# Patient Record
Sex: MALE | Race: WHITE | NOT HISPANIC OR LATINO | Employment: FULL TIME | URBAN - METROPOLITAN AREA
[De-identification: names, ages, dates, MRNs, and addresses within clinical notes are randomized per-mention and may not be internally consistent; named-entity substitution may affect disease eponyms.]

---

## 2019-05-24 ENCOUNTER — OFFICE VISIT (OUTPATIENT)
Dept: FAMILY MEDICINE CLINIC | Facility: CLINIC | Age: 39
End: 2019-05-24

## 2019-05-24 VITALS
BODY MASS INDEX: 44.1 KG/M2 | SYSTOLIC BLOOD PRESSURE: 160 MMHG | HEART RATE: 88 BPM | DIASTOLIC BLOOD PRESSURE: 80 MMHG | OXYGEN SATURATION: 98 % | HEIGHT: 71 IN | WEIGHT: 315 LBS | RESPIRATION RATE: 16 BRPM

## 2019-05-24 DIAGNOSIS — R03.0 ELEVATED BLOOD PRESSURE READING IN OFFICE WITHOUT DIAGNOSIS OF HYPERTENSION: ICD-10-CM

## 2019-05-24 DIAGNOSIS — Z79.4 TYPE 2 DIABETES MELLITUS WITH COMPLICATION, WITH LONG-TERM CURRENT USE OF INSULIN (HCC): ICD-10-CM

## 2019-05-24 DIAGNOSIS — E66.01 MORBID OBESITY (HCC): ICD-10-CM

## 2019-05-24 DIAGNOSIS — Z09 HOSPITAL DISCHARGE FOLLOW-UP: Primary | ICD-10-CM

## 2019-05-24 DIAGNOSIS — G47.10 HYPERSOMNIA: ICD-10-CM

## 2019-05-24 DIAGNOSIS — R60.0 BILATERAL EDEMA OF LOWER EXTREMITY: ICD-10-CM

## 2019-05-24 DIAGNOSIS — G47.8 NON-RESTORATIVE SLEEP: ICD-10-CM

## 2019-05-24 DIAGNOSIS — E11.8 TYPE 2 DIABETES MELLITUS WITH COMPLICATION, WITH LONG-TERM CURRENT USE OF INSULIN (HCC): ICD-10-CM

## 2019-05-24 PROCEDURE — 99204 OFFICE O/P NEW MOD 45 MIN: CPT | Performed by: FAMILY MEDICINE

## 2019-05-24 RX ORDER — FUROSEMIDE 20 MG/1
20 TABLET ORAL DAILY
Qty: 30 TABLET | Refills: 2 | Status: SHIPPED | OUTPATIENT
Start: 2019-05-24 | End: 2019-06-27 | Stop reason: SDUPTHER

## 2019-05-24 RX ORDER — L. ACIDOPHILUS/L.BULGARICUS 1MM CELL
1 TABLET ORAL
COMMUNITY
Start: 2019-05-12 | End: 2019-08-02

## 2019-05-24 RX ORDER — VENLAFAXINE HYDROCHLORIDE 75 MG/1
75 CAPSULE, EXTENDED RELEASE ORAL DAILY
Qty: 90 CAPSULE | Refills: 1 | Status: SHIPPED | OUTPATIENT
Start: 2019-05-24 | End: 2019-10-08 | Stop reason: SDUPTHER

## 2019-05-24 RX ORDER — VENLAFAXINE HYDROCHLORIDE 75 MG/1
CAPSULE, EXTENDED RELEASE ORAL
COMMUNITY
Start: 2017-02-05 | End: 2019-05-24 | Stop reason: SDUPTHER

## 2019-05-24 RX ORDER — FUROSEMIDE 40 MG/1
40 TABLET ORAL DAILY
COMMUNITY
Start: 2019-05-12 | End: 2019-05-24 | Stop reason: SDUPTHER

## 2019-05-24 NOTE — PROGRESS NOTES
Subjective   Paul Medina is a 38 y.o. male.     38-year-old male presents today as a new patient to me for hospital follow-up.    Admit date: 5/9/2019  Discharge date and time: 5/12/2019  Discharged Condition: good    Discharge Diagnoses:Principal Problem:  Cellulitis (5/10/2019) POA: Yes  Active Problems:  Class 3 severe obesity in adult (CMS/[facility]) (5/9/2019) POA: Yes  Type 1 diabetes mellitus (CMS/[facility]) (1/1/1986) POA: Yes  Secondary lymphedema (5/10/2019) POA: Yes      PMHX:   Past Medical History:   Diagnosis Date   • Allergy history unknown   • GERD (gastroesophageal reflux disease)   • Migraine   occular   • Type 1 diabetes mellitus (CMS/[facility]) 1986     PSHX:   Past Surgical History:   Procedure Laterality Date   • CARPAL TUNNEL RELEASE Bilateral   • TRIGGER FINGER RELEASE Right     Hospital Course: Paul Medina presented to [facility] due to chief complaint of bilateral lower extremity erythema, and swelling. He was diagnosed as cellulitis, and started on IV clindamycin. Infectious disease consultation was obtained, and he was switched to Kefzol. The patient improved, and is feeling better. He has been cleared for discharge on Keflex. He was diuresed on IV Lasix, and for now, we will plan to increase his oral Lasix dose at home from 20 milligrams once daily to 40 milligrams once daily. He is recommended compression stockings. Follow up as an outpatient with PCP in 1-2 weeks. He is recommended weight loss.    Patient Instructions:   Current Discharge Medication List     START taking these medications   Details   cephalexin (KEFLEX) 500 MG capsule Take 1 capsule by mouth 4 (four) times daily for 7 days.  Qty: 28 capsule, Refills: 0     Lactobacillus (FLORANEX) Take 1 tablet by mouth 2 (two) times daily.  Qty: 14 tablet, Refills: 0       CONTINUE these medications which have CHANGED   Details   furosemide (LASIX) 40 MG tablet Take 1 tablet by mouth daily.  Qty: 30 tablet, Refills: 0        CONTINUE these medications which have NOT CHANGED   Details   insulin degludec (TRESIBA) 100 UNIT/ML pen Inject 70 Units into the skin daily .     insulin lispro (ADMELOG SOLOSTAR) 100 UNIT/ML injection Inject into the skin 3 (three) times daily before meals.     meloxicam (MOBIC) 15 MG tablet Take 1 tablet by mouth daily  Qty: 30 tablet, Refills: 0   Associated Diagnoses: Chronic right hip pain; Strain of right hip adductor muscle, initial encounter     metFORMIN (GLUCOPHAGE) 500 MG tablet Take 500 mg by mouth 2 (two) times daily with meals Pt not sure of dose .     OMEPRAZOLE PO Take 20 mg by mouth daily .     venlafaxine (EFFEXOR-XR) 75 MG 24 hr capsule       STOP taking these medications     insulin glargine (LANTUS) 100 UNIT/ML injection     insulin glulisine (APIDRA) 100 UNIT/ML injection     ----------------------------------    Today patient reports needs Rx for custom compression stockings. No new issues.  Lasix 40 mg a day has been helping.  Was told to continue Lasix 40 mg a day until he runs out and return as 20 mg a day.  Patient is prescription for 20 mg tablets.  Reports he tries to keep his legs elevated which helps decrease lower extremity swelling.  He is moisturizing daily.  Try getting compression stockings from [employer] however needs a bigger size and actually needs to have some custom-made.  Needs prescription for this.    Diabetes mellitus: Follows with endocrinologist regularly; they Rx his insulin and metformin. Has an appointment  Again July 10 2019. Last A1C in hospital was 8%.  Reports that he is aware of this is usually where he stays.  He is working on this with his endocrinologist.  Will need record release form    Goes to hand specialist for steroid injections in R thumb for trigger finger; follows with them regularly.     Takes Effexor XR 75 mg QD for Depression; has been on this for 4-5 years. Feels it controls his depression/anxiety symptoms very well. Has tried Prozac in the  past; he did not react very well to it. This was prescribed by his last PCP.  Needs refill.  Denies suicidal homicidal ideation.    Sleep: snores, denies witnessed apneas however is unsure, denies nighttime awakenings gasping for air/choking, no morning HA or dry mouth, reports hypersomnia; tries to get 6-7 hours of sleep; sometimes reports non restorative sleep.  Reports sleep is restless.  Mallampati 4.    Elevated blood pressure reading of 160/80 today.  Denies chest pain shortness of breath or headache with vision changes.  Denies history of hypertension.         No flowsheet data found.    The following portions of the patient's history were reviewed and updated as appropriate: allergies, current medications, past family history, past medical history, past social history, past surgical history and problem list.    Review of Systems   Constitutional: Positive for fatigue. Negative for activity change, appetite change, chills and fever.   HENT: Negative for congestion, ear pain, sinus pressure and sore throat.    Respiratory: Negative for cough and shortness of breath.    Cardiovascular: Positive for leg swelling. Negative for chest pain and palpitations.   Gastrointestinal: Negative for abdominal pain, blood in stool, constipation and diarrhea.   Musculoskeletal: Negative for back pain.   Psychiatric/Behavioral: Positive for sleep disturbance. Negative for dysphoric mood, self-injury, suicidal ideas, depressed mood and stress. The patient is not nervous/anxious.        Objective   Physical Exam   Constitutional: He is oriented to person, place, and time. He appears well-developed and well-nourished.   HENT:   Head: Normocephalic and atraumatic.   Nose: Nose normal.   Mouth/Throat: Uvula is midline and mucous membranes are normal. No oropharyngeal exudate, posterior oropharyngeal edema, posterior oropharyngeal erythema or tonsillar abscesses.   Eyes: Conjunctivae and EOM are normal. Pupils are equal, round, and  reactive to light.   Cardiovascular: Normal rate and regular rhythm.   Trace pitting edema bilaterally lower extremity; no erythema or drainage or dry skin   Pulmonary/Chest: Effort normal and breath sounds normal. No respiratory distress. He has no decreased breath sounds.   Musculoskeletal: Normal range of motion.   Lymphadenopathy:        Right cervical: No superficial cervical adenopathy present.       Left cervical: No superficial cervical adenopathy present.   Neurological: He is alert and oriented to person, place, and time.   Psychiatric: He has a normal mood and affect. His speech is normal.         No results found for: COLORU, CLARITYU, SPECGRAV, PHUR, LEUKOCYTESUR, NITRITE, PROTEINPOCUA, GLUCOSEUR, KETONESU, UROBILINOGEN, BILIRUBINUR, RBCUR      Assessment/Plan     Paul was seen today for establish care and leg swelling.    Diagnoses and all orders for this visit:    Hospital discharge follow-up  -     CBC & Differential  -     Basic Metabolic Panel    Morbid obesity (CMS/HCC)  -     Home Sleep Study; Future    Type 2 diabetes mellitus with complication, with long-term current use of insulin (CMS/[facility])    Elevated blood pressure reading in office without diagnosis of hypertension    Bilateral edema of lower extremity  -     Compression Stockings  -     furosemide (LASIX) 20 MG tablet; Take 1 tablet by mouth Daily.    Non-restorative sleep  -     Home Sleep Study; Future    Hypersomnia  -     Home Sleep Study; Future    Other orders  -     Cancel: Comprehensive Metabolic Panel  -     Cancel: Hemoglobin A1c  -     Cancel: Lipid Panel  -     Cancel: TSH Rfx On Abnormal To Free T4  -     Cancel: Ambulatory Referral to Endocrinology  -     venlafaxine XR (EFFEXOR-XR) 75 MG 24 hr capsule; Take 1 capsule by mouth Daily.      RTC with BP log in 4 weeks.  Follow-up repeat CBC due to low hemoglobin in the hospital of 13.4 before discharge and follow-up BMP to check kidney function.  Follow-up home study.  Rx was  given for compression stockings to be custom made.  Go to ER for any shortness of breath/difficulty breathing/chest pain.  After 40 mg tablets of Lasix for now start 20 mg a day.  This will happen about 2 weeks.  If increase in edema occurs call me and we will consider going back on to 40 mg a day.

## 2019-05-25 LAB
BASOPHILS # BLD AUTO: 0.08 10*3/MM3 (ref 0–0.2)
BASOPHILS NFR BLD AUTO: 1.1 % (ref 0–1.5)
BUN SERPL-MCNC: 13 MG/DL (ref 6–20)
BUN/CREAT SERPL: 17.8 (ref 7–25)
CALCIUM SERPL-MCNC: 9.7 MG/DL (ref 8.6–10.5)
CHLORIDE SERPL-SCNC: 103 MMOL/L (ref 98–107)
CO2 SERPL-SCNC: 23.4 MMOL/L (ref 22–29)
CREAT SERPL-MCNC: 0.73 MG/DL (ref 0.76–1.27)
EOSINOPHIL # BLD AUTO: 0.28 10*3/MM3 (ref 0–0.4)
EOSINOPHIL NFR BLD AUTO: 3.9 % (ref 0.3–6.2)
ERYTHROCYTE [DISTWIDTH] IN BLOOD BY AUTOMATED COUNT: 13.4 % (ref 12.3–15.4)
GLUCOSE SERPL-MCNC: 139 MG/DL (ref 65–99)
HCT VFR BLD AUTO: 45.7 % (ref 37.5–51)
HGB BLD-MCNC: 14 G/DL (ref 13–17.7)
IMM GRANULOCYTES # BLD AUTO: 0.07 10*3/MM3 (ref 0–0.05)
IMM GRANULOCYTES NFR BLD AUTO: 1 % (ref 0–0.5)
LYMPHOCYTES # BLD AUTO: 1.45 10*3/MM3 (ref 0.7–3.1)
LYMPHOCYTES NFR BLD AUTO: 20.4 % (ref 19.6–45.3)
MCH RBC QN AUTO: 26.4 PG (ref 26.6–33)
MCHC RBC AUTO-ENTMCNC: 30.6 G/DL (ref 31.5–35.7)
MCV RBC AUTO: 86.1 FL (ref 79–97)
MONOCYTES # BLD AUTO: 0.63 10*3/MM3 (ref 0.1–0.9)
MONOCYTES NFR BLD AUTO: 8.8 % (ref 5–12)
NEUTROPHILS # BLD AUTO: 4.61 10*3/MM3 (ref 1.7–7)
NEUTROPHILS NFR BLD AUTO: 64.8 % (ref 42.7–76)
NRBC BLD AUTO-RTO: 0 /100 WBC (ref 0–0.2)
PLATELET # BLD AUTO: 345 10*3/MM3 (ref 140–450)
POTASSIUM SERPL-SCNC: 4.7 MMOL/L (ref 3.5–5.2)
RBC # BLD AUTO: 5.31 10*6/MM3 (ref 4.14–5.8)
SODIUM SERPL-SCNC: 141 MMOL/L (ref 136–145)
WBC # BLD AUTO: 7.12 10*3/MM3 (ref 3.4–10.8)

## 2019-05-28 DIAGNOSIS — Z09 HOSPITAL DISCHARGE FOLLOW-UP: Primary | ICD-10-CM

## 2019-05-28 DIAGNOSIS — R60.0 BILATERAL EDEMA OF LOWER EXTREMITY: ICD-10-CM

## 2019-05-29 NOTE — PROGRESS NOTES
Please inform patient CBC shows a normal Hb.     BMP shows GFR is normal as well (kidney function). Creatinine is a little low however we can recheck this 1 week prior to next appointment.

## 2019-06-17 ENCOUNTER — RESULTS ENCOUNTER (OUTPATIENT)
Dept: FAMILY MEDICINE CLINIC | Facility: CLINIC | Age: 39
End: 2019-06-17

## 2019-06-17 DIAGNOSIS — Z09 HOSPITAL DISCHARGE FOLLOW-UP: ICD-10-CM

## 2019-06-17 DIAGNOSIS — R60.0 BILATERAL EDEMA OF LOWER EXTREMITY: ICD-10-CM

## 2019-06-19 LAB
BUN SERPL-MCNC: 10 MG/DL (ref 6–20)
BUN/CREAT SERPL: 12.3 (ref 7–25)
CALCIUM SERPL-MCNC: 9.5 MG/DL (ref 8.6–10.5)
CHLORIDE SERPL-SCNC: 100 MMOL/L (ref 98–107)
CO2 SERPL-SCNC: 26.3 MMOL/L (ref 22–29)
CREAT SERPL-MCNC: 0.81 MG/DL (ref 0.76–1.27)
GLUCOSE SERPL-MCNC: 127 MG/DL (ref 65–99)
POTASSIUM SERPL-SCNC: 4.7 MMOL/L (ref 3.5–5.2)
SODIUM SERPL-SCNC: 140 MMOL/L (ref 136–145)

## 2019-06-21 ENCOUNTER — OFFICE VISIT (OUTPATIENT)
Dept: FAMILY MEDICINE CLINIC | Facility: CLINIC | Age: 39
End: 2019-06-21

## 2019-06-21 VITALS
WEIGHT: 315 LBS | SYSTOLIC BLOOD PRESSURE: 128 MMHG | RESPIRATION RATE: 18 BRPM | TEMPERATURE: 97.8 F | HEIGHT: 71 IN | OXYGEN SATURATION: 96 % | BODY MASS INDEX: 44.1 KG/M2 | HEART RATE: 87 BPM | DIASTOLIC BLOOD PRESSURE: 74 MMHG

## 2019-06-21 DIAGNOSIS — I89.0 SECONDARY LYMPHEDEMA: ICD-10-CM

## 2019-06-21 DIAGNOSIS — E66.01 CLASS 3 SEVERE OBESITY WITH SERIOUS COMORBIDITY AND BODY MASS INDEX (BMI) OF 60.0 TO 69.9 IN ADULT, UNSPECIFIED OBESITY TYPE (HCC): ICD-10-CM

## 2019-06-21 DIAGNOSIS — L03.119 CELLULITIS OF LOWER EXTREMITY, UNSPECIFIED LATERALITY: ICD-10-CM

## 2019-06-21 DIAGNOSIS — I10 ESSENTIAL HYPERTENSION: Primary | ICD-10-CM

## 2019-06-21 DIAGNOSIS — R60.0 BILATERAL LOWER EXTREMITY EDEMA: ICD-10-CM

## 2019-06-21 PROBLEM — E66.813 CLASS 3 SEVERE OBESITY IN ADULT: Status: ACTIVE | Noted: 2019-05-24

## 2019-06-21 PROBLEM — L03.90 CELLULITIS: Status: ACTIVE | Noted: 2019-05-10

## 2019-06-21 PROBLEM — E10.9 TYPE 1 DIABETES MELLITUS (HCC): Status: ACTIVE | Noted: 2019-05-24

## 2019-06-21 PROCEDURE — 99213 OFFICE O/P EST LOW 20 MIN: CPT | Performed by: FAMILY MEDICINE

## 2019-06-21 RX ORDER — POTASSIUM CHLORIDE 20 MEQ/1
20 TABLET, EXTENDED RELEASE ORAL DAILY
COMMUNITY
Start: 2019-06-18 | End: 2019-08-02 | Stop reason: SDUPTHER

## 2019-06-21 RX ORDER — CEPHALEXIN 500 MG/1
500 CAPSULE ORAL 4 TIMES DAILY
COMMUNITY
Start: 2019-06-18 | End: 2019-06-28

## 2019-06-21 NOTE — PROGRESS NOTES
Subjective   Paul Medina is a 38 y.o. male.     38-year-old male presents today for follow-up on lower extremity swelling, hypertension.    Hypertension: At last visit patient's blood pressure is 160/80.  Today in the office 128/74.  At last visit he was to be on Lasix 20 mg a day.  He is also on potassium chloride tablets 20 mcg once a day.    Lower extremity edema: We had also prescribed him custom-made compression stockings to help with this.  Today patient reports still working on this because he is still having issues with LE swelling and now recently diagnosed with cellulitis. He would like to get LE swelling under better control before getting the stockings made.     Concern for sleep apnea: Home sleep study was ordered. This is scheduled for June 28, 2019.    We rechecked his kidney function since last visit; it is now normal.    3 days ago he went to urgent care with concern for cellulitis.  This was of his lower extremities.  He was prescribed Keflex to help with this.  He was also advised to increase his Lasix to 40 mg twice a day.  This is inc from 20 mg once a day. Today patient reports with increase to 40 mg BID he has seen improvement however still would like to see more improvement.    Last visit we also placed referral for endocrinology.  Patient is on insulin.  Today he reports labs are taken on 7/3/19 and appointment on 7/10/19. Will be seeing Dr. Ricki Pineda.          No flowsheet data found.    The following portions of the patient's history were reviewed and updated as appropriate: allergies, current medications, past family history, past medical history, past social history, past surgical history and problem list.    Review of Systems   Constitutional: Negative for chills, fatigue and fever.   HENT: Negative for congestion.    Respiratory: Negative for apnea and shortness of breath.    Cardiovascular: Positive for palpitations and leg swelling. Negative for chest pain.   Genitourinary:  Negative for nocturia.   Skin: Positive for rash.   Psychiatric/Behavioral: Negative for depressed mood and stress. The patient is not nervous/anxious.        Objective   Physical Exam   Constitutional: He is oriented to person, place, and time. He appears well-developed and well-nourished.   HENT:   Head: Normocephalic and atraumatic.   Eyes: EOM are normal. Pupils are equal, round, and reactive to light.   Neck: Normal range of motion. Neck supple.   Cardiovascular: Normal rate, regular rhythm and normal heart sounds.   No murmur heard.  +1 pitting edema LE b/l   Pulmonary/Chest: Effort normal and breath sounds normal. No stridor. No respiratory distress. He has no wheezes. He has no rales.   Neurological: He is alert and oriented to person, place, and time.   Skin: Skin is warm. Rash noted.   Erythema; faint; over RLE anteriorly; cellulitic infection which patient reports has improved since starting antibiotics    Psychiatric: He has a normal mood and affect. His behavior is normal.               Assessment/Plan     Paul was seen today for hypertension and edema.    Diagnoses and all orders for this visit:    Essential hypertension  -     Ambulatory Referral to Cardiology    Secondary lymphedema  -     Ambulatory Referral to Lymphedema Clinic    Cellulitis of lower extremity, unspecified laterality    Class 3 severe obesity with serious comorbidity and body mass index (BMI) of 60.0 to 69.9 in adult, unspecified obesity type (CMS/HCC)    Bilateral lower extremity edema  -     Ambulatory Referral to Cardiology  -     Ambulatory Referral to Lymphedema Clinic      Discussed that I would like a recheck of his kidney function while he is on the higher dose of Lasix until we get him into Cards however because he already has labs ordered via Endo in less than 2 weeks we can use those. Discussed will try to dec Lasix if we can. Discussed going to [facility] and still getting stockings made as they can help if this is  dependent edema.      If any new or worsening symptoms, especially in relation to LE edema, such as CP or SOB, go to ER. Patient expressed understanding and agreeable to plan.

## 2019-06-27 DIAGNOSIS — R60.0 BILATERAL EDEMA OF LOWER EXTREMITY: ICD-10-CM

## 2019-06-28 ENCOUNTER — HOSPITAL ENCOUNTER (OUTPATIENT)
Dept: SLEEP MEDICINE | Facility: HOSPITAL | Age: 39
Discharge: HOME | End: 2019-06-28
Admitting: FAMILY MEDICINE

## 2019-06-28 DIAGNOSIS — G47.10 HYPERSOMNIA: ICD-10-CM

## 2019-06-28 DIAGNOSIS — G47.8 NON-RESTORATIVE SLEEP: ICD-10-CM

## 2019-06-28 DIAGNOSIS — E66.01 MORBID OBESITY (HCC): ICD-10-CM

## 2019-06-28 PROCEDURE — 95806 SLEEP STUDY UNATT&RESP EFFT: CPT

## 2019-06-28 PROCEDURE — 95806 SLEEP STUDY UNATT&RESP EFFT: CPT | Performed by: FAMILY MEDICINE

## 2019-06-28 RX ORDER — FUROSEMIDE 20 MG/1
TABLET ORAL
Qty: 90 TABLET | Refills: 0 | Status: SHIPPED | OUTPATIENT
Start: 2019-06-28 | End: 2019-09-23 | Stop reason: SDUPTHER

## 2019-07-03 ENCOUNTER — TELEPHONE (OUTPATIENT)
Dept: FAMILY MEDICINE CLINIC | Facility: CLINIC | Age: 39
End: 2019-07-03

## 2019-07-18 ENCOUNTER — APPOINTMENT (OUTPATIENT)
Dept: PHYSICAL THERAPY | Facility: HOSPITAL | Age: 39
End: 2019-07-18

## 2019-07-25 ENCOUNTER — TELEPHONE (OUTPATIENT)
Dept: SLEEP MEDICINE | Facility: HOSPITAL | Age: 39
End: 2019-07-25

## 2019-07-25 NOTE — TELEPHONE ENCOUNTER
Patient called requesting HST results, I reviewed with the patient, he wants his orders faxed to [facility]. Will schedule follow up after set up

## 2019-08-02 ENCOUNTER — OFFICE VISIT (OUTPATIENT)
Dept: FAMILY MEDICINE CLINIC | Facility: CLINIC | Age: 39
End: 2019-08-02

## 2019-08-02 VITALS
BODY MASS INDEX: 44.1 KG/M2 | TEMPERATURE: 97.8 F | OXYGEN SATURATION: 97 % | DIASTOLIC BLOOD PRESSURE: 77 MMHG | HEART RATE: 87 BPM | RESPIRATION RATE: 20 BRPM | WEIGHT: 315 LBS | HEIGHT: 71 IN | SYSTOLIC BLOOD PRESSURE: 131 MMHG

## 2019-08-02 DIAGNOSIS — R60.0 BILATERAL EDEMA OF LOWER EXTREMITY: ICD-10-CM

## 2019-08-02 DIAGNOSIS — I89.0 SECONDARY LYMPHEDEMA: ICD-10-CM

## 2019-08-02 DIAGNOSIS — L03.119 CELLULITIS OF LOWER EXTREMITY, UNSPECIFIED LATERALITY: Primary | ICD-10-CM

## 2019-08-02 PROCEDURE — 99214 OFFICE O/P EST MOD 30 MIN: CPT | Performed by: FAMILY MEDICINE

## 2019-08-02 RX ORDER — SULFAMETHOXAZOLE AND TRIMETHOPRIM 800; 160 MG/1; MG/1
1 TABLET ORAL 2 TIMES DAILY
Qty: 14 TABLET | Refills: 0 | Status: SHIPPED | OUTPATIENT
Start: 2019-08-02 | End: 2019-08-06 | Stop reason: SDUPTHER

## 2019-08-02 RX ORDER — POTASSIUM CHLORIDE 20 MEQ/1
20 TABLET, EXTENDED RELEASE ORAL DAILY
Qty: 30 TABLET | Refills: 5 | Status: SHIPPED | OUTPATIENT
Start: 2019-08-02 | End: 2021-03-19

## 2019-08-02 NOTE — PROGRESS NOTES
Subjective   Paul Medina is a 38 y.o. male.     38-year-old male presents today to follow-up on lower extremity swelling/concern for lymphedema.    At last visit we referred patient to cardiology and lymphedema clinic.  He is also been given a prescription for custom made compression socks to be made.  Patient had an appointment set up at lymphedema clinic for July 18 which she canceled per records.  Appointment with cardiology is not until next week. It looks like on July 18 he went to [facility] for cellulitis in bilateral legs.  At that time he was given a prescription for doxycycline and Bactroban ointment.  Reports that Bactrim and ointment ran out pretty quickly because it was a small amount that was given to him.  Reports improvement in erythema of legs bilaterally with doxycycline.  He did finish the course about 5 days ago.  However still has erythema and pain and tenderness.  Has appointment with cardiology next week.  Needs to reschedule lymphedema clinic appointment.  Has not yet made compression stockings which he was given a custom order for.  Recently diagnosed with obstructive sleep apnea; is awaiting CPAP set up.  Denies chest pain shortness of breath or headache with vision changes.  Denies fevers chills or night sweats.         No flowsheet data found.    The following portions of the patient's history were reviewed and updated as appropriate: allergies, current medications, past family history, past medical history, past social history, past surgical history and problem list.    Review of Systems   Constitutional: Negative for chills, fatigue and fever.   HENT: Negative for congestion.    Respiratory: Negative for apnea and shortness of breath.    Cardiovascular: Positive for leg swelling. Negative for chest pain and palpitations.   Genitourinary: Negative for nocturia.   Skin: Positive for rash.   Psychiatric/Behavioral: Negative for sleep disturbance, depressed mood and  stress. The patient is not nervous/anxious.        Objective   Physical Exam   Constitutional: He is oriented to person, place, and time. He appears well-developed and well-nourished.   HENT:   Head: Normocephalic and atraumatic.   Eyes: EOM are normal. Pupils are equal, round, and reactive to light.   Neck: Normal range of motion. Neck supple.   Cardiovascular: Normal rate, regular rhythm and normal heart sounds.   No murmur heard.  Trace to + pitting edema lower extremities bilaterally   Pulmonary/Chest: Effort normal and breath sounds normal. No stridor. No respiratory distress. He has no wheezes. He has no rales.   Neurological: He is alert and oriented to person, place, and time.   Skin: Skin is warm.   Erythema and swelling lower extremities bilaterally; per patient much improved since he first of the doxycycline.  However cellulitis is not completely resolved.   Psychiatric: He has a normal mood and affect. His behavior is normal.       Assessment/Plan     Paul was seen today for edema follow up.    Diagnoses and all orders for this visit:    Cellulitis of lower extremity, unspecified laterality  -     sulfamethoxazole-trimethoprim (BACTRIM DS,SEPTRA DS) 800-160 MG per tablet; Take 1 tablet by mouth 2 (Two) Times a Day for 7 days.    Secondary lymphedema    Bilateral edema of lower extremity  -     potassium chloride (K-DUR,KLOR-CON) 20 MEQ CR tablet; Take 1 tablet by mouth Daily.      Will prescribe Bactrim to help resolve cellulitis.  He does not have any sulfa allergies.  Needs a refill of his potassium supplements for now couple weeks ago.  Last BMP on June 18 showed normal potassium while he was on potassium supplements.  Keep follow-up with cardiology but needs to reschedule appointment with lymphedema clinic.  Also needs to get compression stockings custom-made as ordered/prescribed for him.  Will also get set up with CPAP soon as well.  Return to clinic in 8 weeks for follow-up or sooner if needed if  any new or worsening symptoms or chest pain or shortness of breath or increased pain or fevers or chills or night sweats go to ER.  Patient expressed understanding and agreeable to plan.

## 2019-08-06 ENCOUNTER — TELEPHONE (OUTPATIENT)
Dept: FAMILY MEDICINE CLINIC | Facility: CLINIC | Age: 39
End: 2019-08-06

## 2019-08-06 DIAGNOSIS — L03.119 CELLULITIS OF LOWER EXTREMITY, UNSPECIFIED LATERALITY: ICD-10-CM

## 2019-08-06 RX ORDER — SULFAMETHOXAZOLE AND TRIMETHOPRIM 800; 160 MG/1; MG/1
1 TABLET ORAL 2 TIMES DAILY
Qty: 14 TABLET | Refills: 0 | Status: SHIPPED | OUTPATIENT
Start: 2019-08-06 | End: 2019-08-13

## 2019-08-06 NOTE — TELEPHONE ENCOUNTER
Pt callled asking for antibiotic to be sent to [facility] [address].  It was sent to his mail order pharm on Friday.

## 2019-08-08 ENCOUNTER — OFFICE VISIT (OUTPATIENT)
Dept: CARDIOLOGY | Facility: CLINIC | Age: 39
End: 2019-08-08

## 2019-08-08 VITALS
HEART RATE: 90 BPM | SYSTOLIC BLOOD PRESSURE: 146 MMHG | HEIGHT: 71 IN | OXYGEN SATURATION: 98 % | WEIGHT: 315 LBS | BODY MASS INDEX: 44.1 KG/M2 | DIASTOLIC BLOOD PRESSURE: 78 MMHG

## 2019-08-08 DIAGNOSIS — R06.02 SHORT OF BREATH ON EXERTION: Primary | ICD-10-CM

## 2019-08-08 PROCEDURE — 99244 OFF/OP CNSLTJ NEW/EST MOD 40: CPT | Performed by: INTERNAL MEDICINE

## 2019-08-09 NOTE — PROGRESS NOTES
Subjective:     Encounter Date:08/08/2019      Patient ID: Paul Medina is a 38 y.o. male.    Dear Dr. Mai   thank you for referring this patient for consultation of shortness of breath and lower extremity edema.  Chief Complaint:  Hypertension   This is a chronic problem. The problem is controlled. Associated symptoms include shortness of breath.   Shortness of Breath   This is a chronic problem. The current episode started more than 1 month ago. The problem has been unchanged. Associated symptoms include a rash.       38-year-old gentleman who presents today for evaluation.  Patient has a history of lower extremity edema.  He said in the past is been pretty mild.  He said it started about 5 years ago and in the last 6 months it has dramatically gotten worse.  Patient is morbidly obese at 430 pounds.  He says he has fought weight most of his life is gained about 10 to 15 pounds in the past year.  With the worsening swelling some shortness of breath with exertion he was referred to my office for evaluation from a cardiovascular standpoint.  He denies any types of chest discomfort does describe some fatigue.  He was recently diagnosed with sleep apnea and is currently being set up for a CPAP.    Review of Systems   Respiratory: Positive for shortness of breath and snoring.    Skin: Positive for rash.   Musculoskeletal: Positive for joint pain and muscle cramps.   Psychiatric/Behavioral: Positive for depression.   All other systems reviewed and are negative.      Procedures       Objective:     Physical Exam   Constitutional: He is oriented to person, place, and time. He appears well-developed.   HENT:   Head: Normocephalic.   Eyes: Conjunctivae are normal.   Neck: Normal range of motion.   Cardiovascular: Normal rate, regular rhythm and normal heart sounds.   Pulmonary/Chest: Breath sounds normal.   Abdominal: Soft.   Morbidly obese   Musculoskeletal: Normal range of motion. He exhibits edema.    Neurological: He is alert and oriented to person, place, and time.   Skin: Skin is warm and dry.   Psychiatric: He has a normal mood and affect. His behavior is normal.   Vitals reviewed.      Lab Review:       Assessment:          Diagnosis Plan   1. Short of breath on exertion  Adult Transthoracic Echo Complete W/ Cont if Necessary Per Protocol          Plan:       1.  Shortness of breath as well as lower extremity edema.  Clearly there is a dependent edema component to this.  He says it does improve in the morning but does not completely go away.  He has been set up to see lymphedema clinic which I agree with 100%.  I am going to check an echocardiogram to rule out structural heart disease as well as right sided heart failure.  Another etiology could be a sleep apnea he has been diagnosed and is going to be set up for a CPAP.  This can also help his edema quite a bit.  2.  Morbid obesity obviously this is an issue he continues to wilkinson his weight.  He actually could be a good candidate for some type of gastric bypass/sleeve/balloon therapy.  We will see what his echo shows but I think it is something to consider.

## 2019-08-21 ENCOUNTER — HOSPITAL ENCOUNTER (OUTPATIENT)
Dept: CARDIOLOGY | Facility: HOSPITAL | Age: 39
Discharge: HOME | End: 2019-08-21
Admitting: INTERNAL MEDICINE

## 2019-08-21 VITALS
BODY MASS INDEX: 44.1 KG/M2 | DIASTOLIC BLOOD PRESSURE: 55 MMHG | OXYGEN SATURATION: 98 % | WEIGHT: 315 LBS | SYSTOLIC BLOOD PRESSURE: 122 MMHG | HEIGHT: 71 IN | HEART RATE: 120 BPM

## 2019-08-21 DIAGNOSIS — R06.02 SHORT OF BREATH ON EXERTION: ICD-10-CM

## 2019-08-21 PROCEDURE — 93306 TTE W/DOPPLER COMPLETE: CPT

## 2019-08-21 PROCEDURE — 25010000002 PERFLUTREN (DEFINITY) 8.476 MG IN SODIUM CHLORIDE 0.9 % 10 ML INJECTION: Performed by: INTERNAL MEDICINE

## 2019-08-21 PROCEDURE — 93306 TTE W/DOPPLER COMPLETE: CPT | Performed by: INTERNAL MEDICINE

## 2019-08-21 RX ADMIN — PERFLUTREN 2 ML: 6.52 INJECTION, SUSPENSION INTRAVENOUS at 15:34

## 2019-08-22 LAB
AORTIC ROOT ANNULUS: 2.2 CM
ASCENDING AORTA: 2.8 CM
BH CV ECHO MEAS - ACS: 2.2 CM
BH CV ECHO MEAS - AO MAX PG (FULL): 4.4 MMHG
BH CV ECHO MEAS - AO MAX PG: 9.5 MMHG
BH CV ECHO MEAS - AO MEAN PG (FULL): 1.8 MMHG
BH CV ECHO MEAS - AO MEAN PG: 4.5 MMHG
BH CV ECHO MEAS - AO V2 MAX: 153.8 CM/SEC
BH CV ECHO MEAS - AO V2 MEAN: 97.1 CM/SEC
BH CV ECHO MEAS - AO V2 VTI: 25.3 CM
BH CV ECHO MEAS - ASC AORTA: 2.8 CM
BH CV ECHO MEAS - AVA(I,A): 2.6 CM^2
BH CV ECHO MEAS - AVA(I,D): 2.6 CM^2
BH CV ECHO MEAS - AVA(V,A): 2.4 CM^2
BH CV ECHO MEAS - AVA(V,D): 2.4 CM^2
BH CV ECHO MEAS - BSA(HAYCOCK): 3.2 M^2
BH CV ECHO MEAS - BSA: 2.9 M^2
BH CV ECHO MEAS - BZI_BMI: 60 KILOGRAMS/M^2
BH CV ECHO MEAS - BZI_METRIC_HEIGHT: 180.3 CM
BH CV ECHO MEAS - BZI_METRIC_WEIGHT: 195 KG
BH CV ECHO MEAS - EDV(MOD-SP2): 100 ML
BH CV ECHO MEAS - EDV(MOD-SP4): 80 ML
BH CV ECHO MEAS - EDV(TEICH): 102.4 ML
BH CV ECHO MEAS - EF(CUBED): 67.7 %
BH CV ECHO MEAS - EF(MOD-BP): 66 %
BH CV ECHO MEAS - EF(MOD-SP2): 66 %
BH CV ECHO MEAS - EF(MOD-SP4): 65 %
BH CV ECHO MEAS - EF(TEICH): 59.2 %
BH CV ECHO MEAS - ESV(MOD-SP2): 34 ML
BH CV ECHO MEAS - ESV(MOD-SP4): 28 ML
BH CV ECHO MEAS - ESV(TEICH): 41.8 ML
BH CV ECHO MEAS - FS: 31.4 %
BH CV ECHO MEAS - IVS/LVPW: 1
BH CV ECHO MEAS - IVSD: 1.1 CM
BH CV ECHO MEAS - LAT PEAK E' VEL: 10 CM/SEC
BH CV ECHO MEAS - LV DIASTOLIC VOL/BSA (35-75): 27.4 ML/M^2
BH CV ECHO MEAS - LV MASS(C)D: 189.1 GRAMS
BH CV ECHO MEAS - LV MASS(C)DI: 64.8 GRAMS/M^2
BH CV ECHO MEAS - LV MAX PG: 5 MMHG
BH CV ECHO MEAS - LV MEAN PG: 2.6 MMHG
BH CV ECHO MEAS - LV SYSTOLIC VOL/BSA (12-30): 9.6 ML/M^2
BH CV ECHO MEAS - LV V1 MAX: 112 CM/SEC
BH CV ECHO MEAS - LV V1 MEAN: 74.7 CM/SEC
BH CV ECHO MEAS - LV V1 VTI: 20.5 CM
BH CV ECHO MEAS - LVIDD: 4.7 CM
BH CV ECHO MEAS - LVIDS: 3.2 CM
BH CV ECHO MEAS - LVLD AP2: 8.3 CM
BH CV ECHO MEAS - LVLD AP4: 7.8 CM
BH CV ECHO MEAS - LVLS AP2: 6.3 CM
BH CV ECHO MEAS - LVLS AP4: 6.4 CM
BH CV ECHO MEAS - LVOT AREA (M): 3.1 CM^2
BH CV ECHO MEAS - LVOT AREA: 3.2 CM^2
BH CV ECHO MEAS - LVOT DIAM: 2 CM
BH CV ECHO MEAS - LVPWD: 1.1 CM
BH CV ECHO MEAS - MED PEAK E' VEL: 11 CM/SEC
BH CV ECHO MEAS - MV A DUR: 0.11 SEC
BH CV ECHO MEAS - MV A MAX VEL: 69.1 CM/SEC
BH CV ECHO MEAS - MV DEC SLOPE: 601.1 CM/SEC^2
BH CV ECHO MEAS - MV DEC TIME: 0.19 SEC
BH CV ECHO MEAS - MV E MAX VEL: 108 CM/SEC
BH CV ECHO MEAS - MV E/A: 1.6
BH CV ECHO MEAS - MV MAX PG: 5.8 MMHG
BH CV ECHO MEAS - MV MEAN PG: 2.8 MMHG
BH CV ECHO MEAS - MV P1/2T MAX VEL: 118.1 CM/SEC
BH CV ECHO MEAS - MV P1/2T: 57.6 MSEC
BH CV ECHO MEAS - MV V2 MAX: 120.8 CM/SEC
BH CV ECHO MEAS - MV V2 MEAN: 77.8 CM/SEC
BH CV ECHO MEAS - MV V2 VTI: 28.5 CM
BH CV ECHO MEAS - MVA P1/2T LCG: 1.9 CM^2
BH CV ECHO MEAS - MVA(P1/2T): 3.8 CM^2
BH CV ECHO MEAS - MVA(VTI): 2.3 CM^2
BH CV ECHO MEAS - PA ACC TIME: 0.09 SEC
BH CV ECHO MEAS - PA MAX PG (FULL): 6.7 MMHG
BH CV ECHO MEAS - PA MAX PG: 11.7 MMHG
BH CV ECHO MEAS - PA PR(ACCEL): 39.4 MMHG
BH CV ECHO MEAS - PA V2 MAX: 171 CM/SEC
BH CV ECHO MEAS - PULM A REVS DUR: 0.09 SEC
BH CV ECHO MEAS - PULM A REVS VEL: 36.7 CM/SEC
BH CV ECHO MEAS - PULM DIAS VEL: 48.2 CM/SEC
BH CV ECHO MEAS - PULM S/D: 1.1
BH CV ECHO MEAS - PULM SYS VEL: 53 CM/SEC
BH CV ECHO MEAS - PVA(V,A): 2.6 CM^2
BH CV ECHO MEAS - PVA(V,D): 2.6 CM^2
BH CV ECHO MEAS - QP/QS: 1.2
BH CV ECHO MEAS - RAP SYSTOLE: 3 MMHG
BH CV ECHO MEAS - RV MAX PG: 5 MMHG
BH CV ECHO MEAS - RV MEAN PG: 3 MMHG
BH CV ECHO MEAS - RV V1 MAX: 112 CM/SEC
BH CV ECHO MEAS - RV V1 MEAN: 81.6 CM/SEC
BH CV ECHO MEAS - RV V1 VTI: 19.5 CM
BH CV ECHO MEAS - RVOT AREA: 3.9 CM^2
BH CV ECHO MEAS - RVOT DIAM: 2.2 CM
BH CV ECHO MEAS - RVSP: 19 MMHG
BH CV ECHO MEAS - SI(CUBED): 24.1 ML/M^2
BH CV ECHO MEAS - SI(LVOT): 22.7 ML/M^2
BH CV ECHO MEAS - SI(MOD-SP2): 22.6 ML/M^2
BH CV ECHO MEAS - SI(MOD-SP4): 17.8 ML/M^2
BH CV ECHO MEAS - SI(TEICH): 20.8 ML/M^2
BH CV ECHO MEAS - SUP REN AO DIAM: 2 CM
BH CV ECHO MEAS - SV(CUBED): 70.3 ML
BH CV ECHO MEAS - SV(LVOT): 66.2 ML
BH CV ECHO MEAS - SV(MOD-SP2): 66 ML
BH CV ECHO MEAS - SV(MOD-SP4): 52 ML
BH CV ECHO MEAS - SV(RVOT): 76.7 ML
BH CV ECHO MEAS - SV(TEICH): 60.6 ML
BH CV ECHO MEAS - TAPSE (>1.6): 2.1 CM2
BH CV ECHO MEAS - TR MAX VEL: 198.2 CM/SEC
BH CV ECHO MEASUREMENTS AVERAGE E/E' RATIO: 10.29
BH CV VAS BP RIGHT ARM: NORMAL MMHG
BH CV XLRA - TDI S': 13 CM/SEC
LEFT ATRIUM VOLUME INDEX: 17 ML/M2
LV EF 2D ECHO EST: 66 %
MAXIMAL PREDICTED HEART RATE: 183 BPM
SINUS: 2.6 CM
STJ: 2.7 CM
STRESS TARGET HR: 156 BPM

## 2019-09-16 RX ORDER — SULFAMETHOXAZOLE AND TRIMETHOPRIM 800; 160 MG/1; MG/1
TABLET ORAL
Qty: 14 TABLET | Refills: 0 | OUTPATIENT
Start: 2019-09-16

## 2019-09-23 DIAGNOSIS — R60.0 BILATERAL EDEMA OF LOWER EXTREMITY: ICD-10-CM

## 2019-09-23 RX ORDER — FUROSEMIDE 20 MG/1
20 TABLET ORAL DAILY
Qty: 30 TABLET | Refills: 0 | Status: SHIPPED | OUTPATIENT
Start: 2019-09-23 | End: 2019-10-08 | Stop reason: SDUPTHER

## 2019-10-08 DIAGNOSIS — R60.0 BILATERAL EDEMA OF LOWER EXTREMITY: ICD-10-CM

## 2019-10-08 RX ORDER — VENLAFAXINE HYDROCHLORIDE 75 MG/1
CAPSULE, EXTENDED RELEASE ORAL
Qty: 30 CAPSULE | Refills: 0 | Status: SHIPPED | OUTPATIENT
Start: 2019-10-08 | End: 2020-03-09 | Stop reason: SDUPTHER

## 2019-10-08 RX ORDER — FUROSEMIDE 20 MG/1
TABLET ORAL
Qty: 30 TABLET | Refills: 0 | Status: SHIPPED | OUTPATIENT
Start: 2019-10-08 | End: 2019-11-01 | Stop reason: SDUPTHER

## 2019-11-01 ENCOUNTER — OFFICE VISIT (OUTPATIENT)
Dept: FAMILY MEDICINE CLINIC | Facility: CLINIC | Age: 39
End: 2019-11-01

## 2019-11-01 VITALS
TEMPERATURE: 98.2 F | WEIGHT: 315 LBS | DIASTOLIC BLOOD PRESSURE: 78 MMHG | RESPIRATION RATE: 18 BRPM | HEIGHT: 71 IN | SYSTOLIC BLOOD PRESSURE: 141 MMHG | OXYGEN SATURATION: 100 % | HEART RATE: 68 BPM | BODY MASS INDEX: 44.1 KG/M2

## 2019-11-01 DIAGNOSIS — Z23 INFLUENZA VACCINATION ADMINISTERED AT CURRENT VISIT: ICD-10-CM

## 2019-11-01 DIAGNOSIS — G89.29 CHRONIC BILATERAL LOW BACK PAIN WITHOUT SCIATICA: ICD-10-CM

## 2019-11-01 DIAGNOSIS — I89.0 SECONDARY LYMPHEDEMA: Primary | ICD-10-CM

## 2019-11-01 DIAGNOSIS — E66.01 CLASS 3 SEVERE OBESITY WITH SERIOUS COMORBIDITY AND BODY MASS INDEX (BMI) OF 60.0 TO 69.9 IN ADULT, UNSPECIFIED OBESITY TYPE (HCC): ICD-10-CM

## 2019-11-01 DIAGNOSIS — M54.50 CHRONIC BILATERAL LOW BACK PAIN WITHOUT SCIATICA: ICD-10-CM

## 2019-11-01 DIAGNOSIS — R60.0 BILATERAL EDEMA OF LOWER EXTREMITY: ICD-10-CM

## 2019-11-01 DIAGNOSIS — L25.9 CONTACT DERMATITIS, UNSPECIFIED CONTACT DERMATITIS TYPE, UNSPECIFIED TRIGGER: ICD-10-CM

## 2019-11-01 PROCEDURE — 90471 IMMUNIZATION ADMIN: CPT | Performed by: FAMILY MEDICINE

## 2019-11-01 PROCEDURE — 90674 CCIIV4 VAC NO PRSV 0.5 ML IM: CPT | Performed by: FAMILY MEDICINE

## 2019-11-01 PROCEDURE — 99214 OFFICE O/P EST MOD 30 MIN: CPT | Performed by: FAMILY MEDICINE

## 2019-11-01 RX ORDER — FUROSEMIDE 20 MG/1
TABLET ORAL
Qty: 270 TABLET | Refills: 1 | Status: SHIPPED | OUTPATIENT
Start: 2019-11-01 | End: 2020-03-09 | Stop reason: SDUPTHER

## 2019-11-01 NOTE — PROGRESS NOTES
Subjective   Paul Medina is a 39 y.o. male.     39-year-old male presents today for follow-up on bilateral lower extremity edema/secondary lymphedema.    At last visit we prescribed Bactrim to help resolve cellulitis.  He had follow-up scheduled with cardiology a few days later.  Follow-up was August 8, 2019.  Patient discussed shortness of breath on exertion as well as continued lower extremity edema.  Cardiology agreed with setting him up with lymphedema clinic.  Cardiology noted CHRISSY could be contributing.  Patient has been diagnosed and is to be set up with CPAP.  Echo was ordered and showed EF of 66%, normal left ventricular systolic function; essentially normal.  Has yet to be seen in lymphedema clinic because he lost the phone number to call back to make an appointment.  Reports cellulitis is cleared up since last visit.  Reports decrease Lasix from 40 mg twice daily to 60 mg a day.;  Knows he should be taking it 40 mg every morning and 20 mg every afternoon however sometimes will take 60 mg every morning.  Discussed that this is not appropriate dosage.  Denies chest pain.    A1c last checked in May 2019 was 8.0%.  Follows with endocrinology.  Next appointment a couple weeks.    Is working on diet and exercise still.  Discussed stationary bike/water aerobics.  Does not want to nutritionist referral today; thinks he knows what he needs to do just needs to be consistent with it.    Depression: Continues to do well with the Effexor 75 mg XR daily.    CHRISSY: He is trying trying to use his CPAP more consistently; notes on nights he says that he sleeps better.  Still needs to set up follow-up with me in the sleep clinic.    Rash: Left forearm times a few weeks; itchy and red; no swelling; no drainage; can be dry at times.    Chronic low back pain: Notes in the past he has had to lift heavy things for his job.  He knows his weight is increased and is likely contributing to his intermittent low back pain however  he wants to make sure he did not damage his lower back in terms of heavy lifting in the past either.  Denies numbness or tingling.         No flowsheet data found.    The following portions of the patient's history were reviewed and updated as appropriate: allergies, current medications, past family history, past medical history, past social history, past surgical history and problem list.    Review of Systems   Constitutional: Negative for chills and fever.   Respiratory: Negative for cough and shortness of breath.    Cardiovascular: Positive for leg swelling. Negative for chest pain and palpitations.   Gastrointestinal: Negative for abdominal pain, nausea and vomiting.   Musculoskeletal: Positive for back pain.   Skin: Positive for rash.       Objective   Physical Exam   Constitutional: He is oriented to person, place, and time. He appears well-developed and well-nourished.   HENT:   Head: Normocephalic and atraumatic.   Eyes: EOM are normal. Pupils are equal, round, and reactive to light.   Neck: Normal range of motion. Neck supple.   Cardiovascular: Normal rate, regular rhythm and normal heart sounds.   No murmur heard.  Pulmonary/Chest: Effort normal and breath sounds normal. No stridor. No respiratory distress. He has no wheezes. He has no rales.   Musculoskeletal:        Lumbar back: Normal.   Neurological: He is alert and oriented to person, place, and time.   Skin: Skin is warm.        Psychiatric: He has a normal mood and affect. His behavior is normal.         No results found for: COLORU, CLARITYU, SPECGRAV, PHUR, LEUKOCYTESUR, NITRITE, PROTEINPOCUA, GLUCOSEUR, KETONESU, UROBILINOGEN, BILIRUBINUR, RBCUR      Assessment/Plan     Paul was seen today for edema.    Diagnoses and all orders for this visit:    Secondary lymphedema    Class 3 severe obesity with serious comorbidity and body mass index (BMI) of 60.0 to 69.9 in adult, unspecified obesity type (CMS/HCC)    Chronic bilateral low back pain without  sciatica  -     XR Spine Lumbar 2 or 3 View (In Office)    Influenza vaccination administered at current visit  -     Flucelvax Quad=>4Years (PFS)    Contact dermatitis, unspecified contact dermatitis type, unspecified trigger  -     betamethasone valerate (VALISONE) 0.1 % cream; Apply  topically to the appropriate area as directed 2 (Two) Times a Day for 7 days.    Bilateral edema of lower extremity  -     furosemide (LASIX) 20 MG tablet; Take 2 tablets every morning and 1 tablet every evening        Follow-up x-ray read.  Continue follow-up with endocrinology.  Continue follow-up in lymphedema clinic.  Continue to work on diet and exercise.    Must set up appointment for follow-up in sleep clinic with me for CHRISSY.

## 2020-02-17 ENCOUNTER — APPOINTMENT (OUTPATIENT)
Dept: PHYSICAL THERAPY | Facility: HOSPITAL | Age: 40
End: 2020-02-17

## 2020-02-20 ENCOUNTER — HOSPITAL ENCOUNTER (OUTPATIENT)
Dept: OCCUPATIONAL THERAPY | Facility: HOSPITAL | Age: 40
Setting detail: THERAPIES SERIES
Discharge: HOME | End: 2020-02-20

## 2020-02-20 DIAGNOSIS — L03.119 CELLULITIS OF LOWER EXTREMITY, UNSPECIFIED LATERALITY: ICD-10-CM

## 2020-02-20 DIAGNOSIS — I89.0 LYMPHEDEMA OF BOTH LOWER EXTREMITIES: Primary | ICD-10-CM

## 2020-02-20 PROCEDURE — 97535 SELF CARE MNGMENT TRAINING: CPT

## 2020-02-20 PROCEDURE — 97165 OT EVAL LOW COMPLEX 30 MIN: CPT

## 2020-03-09 ENCOUNTER — OFFICE VISIT (OUTPATIENT)
Dept: FAMILY MEDICINE CLINIC | Facility: CLINIC | Age: 40
End: 2020-03-09

## 2020-03-09 VITALS
SYSTOLIC BLOOD PRESSURE: 138 MMHG | DIASTOLIC BLOOD PRESSURE: 70 MMHG | HEART RATE: 88 BPM | RESPIRATION RATE: 18 BRPM | WEIGHT: 315 LBS | HEIGHT: 71 IN | OXYGEN SATURATION: 98 % | TEMPERATURE: 98.1 F | BODY MASS INDEX: 44.1 KG/M2

## 2020-03-09 DIAGNOSIS — G47.33 OBSTRUCTIVE SLEEP APNEA, ADULT: ICD-10-CM

## 2020-03-09 DIAGNOSIS — I89.0 SECONDARY LYMPHEDEMA: Primary | ICD-10-CM

## 2020-03-09 DIAGNOSIS — R60.0 BILATERAL EDEMA OF LOWER EXTREMITY: ICD-10-CM

## 2020-03-09 DIAGNOSIS — E11.8 TYPE 2 DIABETES MELLITUS WITH COMPLICATION, WITH LONG-TERM CURRENT USE OF INSULIN (HCC): ICD-10-CM

## 2020-03-09 DIAGNOSIS — M54.50 CHRONIC BILATERAL LOW BACK PAIN WITHOUT SCIATICA: ICD-10-CM

## 2020-03-09 DIAGNOSIS — E66.01 CLASS 3 SEVERE OBESITY WITH SERIOUS COMORBIDITY AND BODY MASS INDEX (BMI) OF 60.0 TO 69.9 IN ADULT, UNSPECIFIED OBESITY TYPE (HCC): ICD-10-CM

## 2020-03-09 DIAGNOSIS — Z79.4 TYPE 2 DIABETES MELLITUS WITH COMPLICATION, WITH LONG-TERM CURRENT USE OF INSULIN (HCC): ICD-10-CM

## 2020-03-09 DIAGNOSIS — G89.29 CHRONIC BILATERAL LOW BACK PAIN WITHOUT SCIATICA: ICD-10-CM

## 2020-03-09 PROCEDURE — 99214 OFFICE O/P EST MOD 30 MIN: CPT | Performed by: FAMILY MEDICINE

## 2020-03-09 RX ORDER — INSULIN LISPRO 100 [IU]/ML
INJECTION, SOLUTION INTRAVENOUS; SUBCUTANEOUS
COMMUNITY
Start: 2020-02-04

## 2020-03-09 RX ORDER — FUROSEMIDE 20 MG/1
TABLET ORAL
Qty: 270 TABLET | Refills: 1 | Status: SHIPPED | OUTPATIENT
Start: 2020-03-09 | End: 2020-09-11

## 2020-03-09 RX ORDER — VENLAFAXINE HYDROCHLORIDE 75 MG/1
75 CAPSULE, EXTENDED RELEASE ORAL DAILY
Qty: 30 CAPSULE | Refills: 0 | Status: SHIPPED | OUTPATIENT
Start: 2020-03-09 | End: 2020-04-24 | Stop reason: SDUPTHER

## 2020-03-09 RX ORDER — DULAGLUTIDE 1.5 MG/.5ML
INJECTION, SOLUTION SUBCUTANEOUS
COMMUNITY
Start: 2020-02-20 | End: 2021-03-19 | Stop reason: ALTCHOICE

## 2020-03-09 RX ORDER — BLOOD SUGAR DIAGNOSTIC
STRIP MISCELLANEOUS
COMMUNITY
Start: 2020-01-17

## 2020-03-09 RX ORDER — METFORMIN HYDROCHLORIDE 500 MG/1
500 TABLET, EXTENDED RELEASE ORAL
COMMUNITY
Start: 2020-01-17

## 2020-03-09 RX ORDER — PEN NEEDLE, DIABETIC 31 GX5/16"
NEEDLE, DISPOSABLE MISCELLANEOUS
COMMUNITY
Start: 2020-02-03

## 2020-03-09 NOTE — PROGRESS NOTES
Subjective   Paul Medina is a 39 y.o. male.     39 year old male presents today for follow up on lymphedema of both lower extremities.     Patient was referred to lymphedema clinic. Today reports needs to set up appointment to learn how to wrap compression himself.    T2DM: Was to have seen Endo since last visit. Patient reports A1C is stable at 8%. Still on Metformin, Toujeo, Trulicity and Humalog.    CHRISSY: Has not yet set up follow up with me in sleep clinic.    Chronic low back pain: was supposed to have xray done at last visit; not yet done. Getting better with time however.    Depression: Doing well on Effexor as dosed. Needs refill today. Denies SI/HI/self injury. May consider testing for ADD/ADHD in the future however not today.        PHQ-2/PHQ-9 Depression Screening 3/9/2020   Little interest or pleasure in doing things 0   Feeling down, depressed, or hopeless 0   Total Score 0       The following portions of the patient's history were reviewed and updated as appropriate: allergies, current medications, past family history, past medical history, past social history, past surgical history and problem list.    Review of Systems   Constitutional: Negative for chills and fever.   Respiratory: Negative for cough and shortness of breath.    Cardiovascular: Positive for leg swelling. Negative for chest pain and palpitations.   Gastrointestinal: Negative for abdominal pain, nausea and vomiting.   Musculoskeletal: Positive for arthralgias, back pain and myalgias.   Psychiatric/Behavioral: Positive for sleep disturbance.       Objective   Physical Exam   Constitutional: He is oriented to person, place, and time. He appears well-developed and well-nourished.   HENT:   Head: Normocephalic and atraumatic.   Eyes: Pupils are equal, round, and reactive to light. EOM are normal.   Neck: Normal range of motion. Neck supple.   Cardiovascular: Normal rate, regular rhythm and normal heart sounds.   No murmur  heard.  Pulmonary/Chest: Effort normal and breath sounds normal. No stridor. No respiratory distress. He has no wheezes. He has no rales.   Neurological: He is alert and oriented to person, place, and time.   Skin: Skin is warm.   Psychiatric: He has a normal mood and affect. His behavior is normal.               Assessment/Plan     Paul was seen today for edema.    Diagnoses and all orders for this visit:    Secondary lymphedema  -     Comprehensive Metabolic Panel    Class 3 severe obesity with serious comorbidity and body mass index (BMI) of 60.0 to 69.9 in adult, unspecified obesity type (CMS/HCC)    Obstructive sleep apnea, adult    Type 2 diabetes mellitus with complication, with long-term current use of insulin (CMS/HCC)    Chronic bilateral low back pain without sciatica    Bilateral edema of lower extremity  -     Comprehensive Metabolic Panel      Cont care per lymphedema clinic and Endo. Refilled effexor and lasix. Must set up appt with me in sleep clinic to reassess his CHRISSY. Follow up CMP. RTC in 6 months for follow up or sooner if needed.

## 2020-03-10 ENCOUNTER — TELEPHONE (OUTPATIENT)
Dept: FAMILY MEDICINE CLINIC | Facility: CLINIC | Age: 40
End: 2020-03-10

## 2020-03-10 LAB
ALBUMIN SERPL-MCNC: 4.3 G/DL (ref 3.5–5.2)
ALBUMIN/GLOB SERPL: 1.5 G/DL
ALP SERPL-CCNC: 111 U/L (ref 39–117)
ALT SERPL-CCNC: 28 U/L (ref 1–41)
AST SERPL-CCNC: 18 U/L (ref 1–40)
BILIRUB SERPL-MCNC: 0.3 MG/DL (ref 0.2–1.2)
BUN SERPL-MCNC: 13 MG/DL (ref 6–20)
BUN/CREAT SERPL: 14.6 (ref 7–25)
CALCIUM SERPL-MCNC: 9.6 MG/DL (ref 8.6–10.5)
CHLORIDE SERPL-SCNC: 100 MMOL/L (ref 98–107)
CO2 SERPL-SCNC: 25.5 MMOL/L (ref 22–29)
CREAT SERPL-MCNC: 0.89 MG/DL (ref 0.76–1.27)
GLOBULIN SER CALC-MCNC: 2.8 GM/DL
GLUCOSE SERPL-MCNC: 207 MG/DL (ref 65–99)
POTASSIUM SERPL-SCNC: 5 MMOL/L (ref 3.5–5.2)
PROT SERPL-MCNC: 7.1 G/DL (ref 6–8.5)
SODIUM SERPL-SCNC: 138 MMOL/L (ref 136–145)

## 2020-03-10 NOTE — PROGRESS NOTES
CMP essentially normal except blood sugar is high at 207.  Continue to work on diet and exercise and continue care per endocrinology.

## 2020-03-10 NOTE — TELEPHONE ENCOUNTER
----- Message from Ivan Mai MD sent at 3/10/2020  9:16 AM EDT -----  CMP essentially normal except blood sugar is high at 207.  Continue to work on diet and exercise and continue care per endocrinology.

## 2020-04-03 ENCOUNTER — DOCUMENTATION (OUTPATIENT)
Dept: OCCUPATIONAL THERAPY | Facility: HOSPITAL | Age: 40
End: 2020-04-03

## 2020-04-03 DIAGNOSIS — I89.0 LYMPHEDEMA OF BOTH LOWER EXTREMITIES: Primary | ICD-10-CM

## 2020-04-03 NOTE — THERAPY DISCHARGE NOTE
"Outpatient Occupational Therapy Lymphedema Treatment Note/Discharge Summary       Patient Name: Paul Medina  : 1980  MRN: [id number]  Today's Date: 4/3/2020      Visit Date: 2020    Patient Active Problem List   Diagnosis   • Class 3 severe obesity in adult (CMS/[facility])   • Type 1 diabetes mellitus (CMS/[facility])   • Cellulitis   • Secondary lymphedema   • Obstructive sleep apnea, adult   • Chronic bilateral low back pain without sciatica        Past Medical History:   Diagnosis Date   • Anxiety    • Arthritis    • Depression    • Diabetes mellitus (CMS/[facility])    • GERD (gastroesophageal reflux disease)         Past Surgical History:   Procedure Laterality Date   • CARPAL TUNNEL RELEASE Bilateral          Visit Dx:      ICD-10-CM ICD-9-CM   1. Lymphedema of both lower extremities I89.0 457.1                                   OT Goals     Row Name 20 1300          OT Short Term Goals    STG 1  Patient independent and compliant with self-wrapping techniques of compression bandages with assistance of caregiver as needed for improved self-management of condition.  -RE     STG 1 Progress  Not Met  -RE     STG 2  Patient will demonstrate proper awareness of “What is Lymphedema?” and \"Healthy Habits\" for improved prevention, management, care of symptoms and ease of transition to self-care of condition.   -RE     STG 2 Progress  Not Met  -RE     STG 3  Patient will demonstrate decreased net edema of bilateral lower extremities >/= 5-10cm  for decrease in edema, symptoms, decreased risk of infection and improved skin care/transition to self-care of condition.   -RE     STG 3 Progress  Not Met  -RE        Long Term Goals    LTG 1  Patient will demonstrate decreased net edema of left lower extremity >/= 11-20cm for decrease in edema, symptoms, decreased risk of infection and improved skin care/transition to self-care of condition.   -RE     LTG 1 Progress  Not Met  -RE     LTG 2  Patient independent and " compliant with use and care of compression garments and/or Velcro products with assistance of a caregiver as needed to promote self-care independence.    -RE     LTG 2 Progress  Not Met  -RE       User Key  (r) = Recorded By, (t) = Taken By, (c) = Cosigned By    Initials Name Provider Type    Ene Hernandez, OTR Occupational Therapist                           Time Calculation:                     OP OT Discharge Summary  Reason for Discharge: other (comment)(did not return)  Outcomes Achieved: Other(did not return)  Discharge Destination: Home without follow-up      AMAN Davis  4/3/2020

## 2020-04-15 RX ORDER — VENLAFAXINE HYDROCHLORIDE 75 MG/1
CAPSULE, EXTENDED RELEASE ORAL
Qty: 30 CAPSULE | Refills: 0 | OUTPATIENT
Start: 2020-04-15

## 2020-04-24 RX ORDER — VENLAFAXINE HYDROCHLORIDE 75 MG/1
75 CAPSULE, EXTENDED RELEASE ORAL DAILY
Qty: 90 CAPSULE | Refills: 1 | Status: SHIPPED | OUTPATIENT
Start: 2020-04-24 | End: 2020-09-11 | Stop reason: SDUPTHER

## 2020-04-28 ENCOUNTER — TELEPHONE (OUTPATIENT)
Dept: FAMILY MEDICINE CLINIC | Facility: CLINIC | Age: 40
End: 2020-04-28

## 2020-04-28 NOTE — TELEPHONE ENCOUNTER
Received info that patient requested a call back because he was upset on how his refill was handled.  Spoke with Alisa KEY, and she states she spoke with patient last week, and the patient's med should not have been denied.,  States she sent the refill to the patients mail order pharmacy.  States she also received a message the patient left Friday afternoon that she didn't get until Monday morning asking that a 30 day be sent to local pharmacy.  She sent that to pharmacy.  I called patient and left message on 4/27/20, and also called this morning, but was unable to leave a message.  VM never picked up.

## 2020-07-20 DIAGNOSIS — R60.0 BILATERAL EDEMA OF LOWER EXTREMITY: ICD-10-CM

## 2020-07-20 RX ORDER — FUROSEMIDE 20 MG/1
TABLET ORAL
Qty: 270 TABLET | Refills: 1 | OUTPATIENT
Start: 2020-07-20

## 2020-07-27 DIAGNOSIS — R60.0 BILATERAL EDEMA OF LOWER EXTREMITY: ICD-10-CM

## 2020-07-27 RX ORDER — FUROSEMIDE 20 MG/1
TABLET ORAL
Qty: 270 TABLET | Refills: 1 | OUTPATIENT
Start: 2020-07-27

## 2020-09-11 ENCOUNTER — TELEPHONE (OUTPATIENT)
Dept: FAMILY MEDICINE CLINIC | Facility: CLINIC | Age: 40
End: 2020-09-11

## 2020-09-11 ENCOUNTER — OFFICE VISIT (OUTPATIENT)
Dept: FAMILY MEDICINE CLINIC | Facility: CLINIC | Age: 40
End: 2020-09-11

## 2020-09-11 VITALS
HEIGHT: 71 IN | TEMPERATURE: 96.8 F | BODY MASS INDEX: 44.1 KG/M2 | SYSTOLIC BLOOD PRESSURE: 126 MMHG | RESPIRATION RATE: 16 BRPM | WEIGHT: 315 LBS | DIASTOLIC BLOOD PRESSURE: 76 MMHG | HEART RATE: 84 BPM | OXYGEN SATURATION: 98 %

## 2020-09-11 DIAGNOSIS — F33.40 RECURRENT MAJOR DEPRESSIVE DISORDER, IN REMISSION (HCC): ICD-10-CM

## 2020-09-11 DIAGNOSIS — G47.33 OBSTRUCTIVE SLEEP APNEA, ADULT: ICD-10-CM

## 2020-09-11 DIAGNOSIS — G89.29 CHRONIC BILATERAL LOW BACK PAIN WITHOUT SCIATICA: ICD-10-CM

## 2020-09-11 DIAGNOSIS — R60.0 BILATERAL EDEMA OF LOWER EXTREMITY: ICD-10-CM

## 2020-09-11 DIAGNOSIS — I89.0 SECONDARY LYMPHEDEMA: Primary | ICD-10-CM

## 2020-09-11 DIAGNOSIS — Z23 IMMUNIZATION DUE: Primary | ICD-10-CM

## 2020-09-11 DIAGNOSIS — M54.50 CHRONIC BILATERAL LOW BACK PAIN WITHOUT SCIATICA: ICD-10-CM

## 2020-09-11 DIAGNOSIS — E66.01 CLASS 3 SEVERE OBESITY WITH SERIOUS COMORBIDITY AND BODY MASS INDEX (BMI) OF 60.0 TO 69.9 IN ADULT, UNSPECIFIED OBESITY TYPE (HCC): ICD-10-CM

## 2020-09-11 DIAGNOSIS — Z23 ENCOUNTER FOR ADMINISTRATION OF VACCINE: ICD-10-CM

## 2020-09-11 PROBLEM — L03.90 CELLULITIS: Status: RESOLVED | Noted: 2019-05-10 | Resolved: 2020-09-11

## 2020-09-11 PROCEDURE — 99214 OFFICE O/P EST MOD 30 MIN: CPT | Performed by: FAMILY MEDICINE

## 2020-09-11 PROCEDURE — 90686 IIV4 VACC NO PRSV 0.5 ML IM: CPT | Performed by: FAMILY MEDICINE

## 2020-09-11 PROCEDURE — 90471 IMMUNIZATION ADMIN: CPT | Performed by: FAMILY MEDICINE

## 2020-09-11 RX ORDER — FUROSEMIDE 40 MG/1
40 TABLET ORAL DAILY
Qty: 90 TABLET | Refills: 1 | Status: SHIPPED | OUTPATIENT
Start: 2020-09-11 | End: 2021-02-12 | Stop reason: SDUPTHER

## 2020-09-11 RX ORDER — VENLAFAXINE HYDROCHLORIDE 75 MG/1
75 CAPSULE, EXTENDED RELEASE ORAL DAILY
Qty: 90 CAPSULE | Refills: 1 | Status: SHIPPED | OUTPATIENT
Start: 2020-09-11 | End: 2021-03-19 | Stop reason: SDUPTHER

## 2020-09-11 NOTE — PROGRESS NOTES
Subjective   Paul Medina is a 39 y.o. male.     History of Present Illness     39-year-old male presents today for 6-month follow-up on edema, type 2 diabetes mellitus, obstructive sleep apnea, depression.    Depression: Effexor 75 mg XR once a day.  Continues to control symptoms well.  Denies suicidal hospitalization or self injury.    CHRISSY: Has not yet scheduled follow-up with me in sleep clinic.  Reports he may need a mask fitting as he sleeps on his stomach and his current mask is uncomfortable.  Amenable to calling the sleep lab for mask fitting and follow-up with me sometime after then.    Lymphedema: Reports lymphedema clinic.  Today patient reports has not been able to get scheduled for leg wrapping however doing better since working from home and can keep legs up.    Type 1 diabetes mellitus: Patient was referred to endocrinology.  Reports is seeing Dr Ricki Pineda; saw him a few weeks ago; A1C down to 7.8.%. He is being Rx'd meds to help with weight loss. Has lost 26 pounds in the last 6 months.    Back pain continues to improve with weight loss.    The following portions of the patient's history were reviewed and updated as appropriate: allergies, current medications, past family history, past medical history, past social history, past surgical history and problem list.    Review of Systems   Constitutional: Negative for chills and fever.   Respiratory: Negative for cough and shortness of breath.    Cardiovascular: Negative for chest pain, palpitations and leg swelling.   Gastrointestinal: Negative for abdominal pain, nausea and vomiting.       Objective   Physical Exam   Constitutional: He is oriented to person, place, and time. He appears well-developed and well-nourished.   HENT:   Head: Normocephalic and atraumatic.   Eyes: Pupils are equal, round, and reactive to light. EOM are normal.   Neck: Normal range of motion. Neck supple.   Cardiovascular: Normal rate, regular rhythm and normal heart  sounds.   No murmur heard.  Pulmonary/Chest: Effort normal and breath sounds normal. No stridor. No respiratory distress. He has no wheezes. He has no rales.   Neurological: He is alert and oriented to person, place, and time.   Skin: Skin is warm.   Psychiatric: He has a normal mood and affect. His behavior is normal.               Assessment/Plan     Paul was seen today for check up.    Diagnoses and all orders for this visit:    Secondary lymphedema    Obstructive sleep apnea, adult    Class 3 severe obesity with serious comorbidity and body mass index (BMI) of 60.0 to 69.9 in adult, unspecified obesity type (CMS/HCC)    Chronic bilateral low back pain without sciatica    Bilateral edema of lower extremity  -     furosemide (LASIX) 40 MG tablet; Take 1 tablet by mouth Daily. Take 2 tablets every morning and 1 tablet every evening  -     Comprehensive Metabolic Panel    Recurrent major depressive disorder, in remission (CMS/HCC)  -     venlafaxine XR (EFFEXOR-XR) 75 MG 24 hr capsule; Take 1 capsule by mouth Daily.    Encounter for administration of vaccine  -     Fluarix/Fluzone/Afluria Quad>6 Months  -     Cancel: Td Vaccine Greater Than or Equal To 8yo With Preservative IM      [facility] end[facility]ology will get records release form today.  Continue care per endocrinology.  Continue with weight loss.  Get mask fitting done at the sleep lab and follow-up with me in the sleep lab.  Needs to get TD booster however we are out of it today.  Return to clinic in 6 months for follow-up or sooner if needed.

## 2020-10-23 DIAGNOSIS — R60.0 BILATERAL EDEMA OF LOWER EXTREMITY: ICD-10-CM

## 2020-10-23 RX ORDER — FUROSEMIDE 20 MG/1
TABLET ORAL
Qty: 270 TABLET | Refills: 1 | OUTPATIENT
Start: 2020-10-23

## 2021-02-07 DIAGNOSIS — R60.0 BILATERAL EDEMA OF LOWER EXTREMITY: ICD-10-CM

## 2021-02-08 RX ORDER — FUROSEMIDE 20 MG/1
TABLET ORAL
Qty: 270 TABLET | Refills: 1 | OUTPATIENT
Start: 2021-02-08

## 2021-02-11 ENCOUNTER — TELEPHONE (OUTPATIENT)
Dept: FAMILY MEDICINE CLINIC | Facility: CLINIC | Age: 41
End: 2021-02-11

## 2021-02-12 DIAGNOSIS — R60.0 BILATERAL EDEMA OF LOWER EXTREMITY: ICD-10-CM

## 2021-02-12 RX ORDER — FUROSEMIDE 40 MG/1
40 TABLET ORAL DAILY
Qty: 90 TABLET | Refills: 1 | Status: SHIPPED | OUTPATIENT
Start: 2021-02-12 | End: 2023-03-13

## 2021-02-16 ENCOUNTER — TELEPHONE (OUTPATIENT)
Dept: FAMILY MEDICINE CLINIC | Facility: CLINIC | Age: 41
End: 2021-02-16

## 2021-02-16 NOTE — TELEPHONE ENCOUNTER
Spoke with patient and he stated that he has been taking two 20 mg tablets in the AM and that is all. Will call pharmacy back to let them know

## 2021-02-16 NOTE — TELEPHONE ENCOUNTER
PATIENTS PHARMACY CALLED STATED THAT THE  furosemide (LASIX) 40 MG tablet    CAME OVER WITHOUT ANY INSTRUCTIONS ON DOSAGE INTAKE.  [employer] FROM [employer] CALLING TO GET [employer]AR[employer] ON THE INTAKE INSTRUCTIONS FOR THE PATIENT.    REF # [id number]    PLEASE ADVISE  [phone]

## 2021-02-16 NOTE — TELEPHONE ENCOUNTER
Per my records he was taking 2 tablets in the morning and 1 tablet in the evening.  Please confirm this with patient first.  He is also overdue for CMP which needs to get done.  Orders already placed.

## 2021-03-09 NOTE — TELEPHONE ENCOUNTER
Caller: Paul Medina    Relationship to patient: Self    Best call back number: [phone]    Patient is needing: PATIENT IS CALLING IN REGARDS TO THE DENIED MED REFILL ON LASIX. HE STATED THAT HE HAS ALREADY MADE AN APPT FOR MARCH AND THAT THIS HAS HAPPENED BEFORE. HE WOULDLIKE TO BE CONTACTED IF MEDICATION IS NOT ABLE TO BE FILLED.    PLEASE ADVISE            Obstetric Discharge Summary    Reasons for Admission on 3/6/2021  6:26 AM  Onset of Labor    Prenatal Procedures  None    Intrapartum Procedures  LSTCS    Postpartum Procedures  None    [place] Data  Information for the patient's :  Greek Jew Girl Miguel Ángel Kamille [[id number]]   female   Birth Weight: 7 lb 11.3 oz (3.494 kg)     Discharge With Mother  Complications: No    Discharge Diagnosis  Patient Active Problem List    Diagnosis Date Noted    Admitted to labor and delivery 2021    Term pregnancy        Discharge Information  Current Discharge Medication List      CONTINUE these medications which have NOT CHANGED    Details   Prenatal MV-Min-Fe Fum-FA-DHA (PRENATAL 1 PO) Take 1 tablet by mouth daily pt taking OTC prenatal vitamins      Progesterone Micronized (PROGESTERONE PO) Take by mouth daily      [employer] Ultra Thin Lancets MISC 1 box by Does not apply route 4 times daily (after meals and at bedtime)  Qty: 100 each, Refills: 5      blood glucose monitor strips by Other route 4 times daily (after meals and at bedtime) Test 4 times a day & as needed for symptoms of irregular blood glucose. Qty: 100 strip, Refills: 11      Alcohol Swabs PADS 1 box by Does not apply route 4 times daily (before meals and nightly)  Qty: 100 each, Refills: 5      glucose monitoring kit (FREESTYLE) monitoring kit 1 kit by Does not apply route daily Test AC, HS and PRN Please dispense what insurance covers  Qty: 1 kit, Refills: 0    Associated Diagnoses: Diet controlled gestational diabetes mellitus (GDM) in second trimester      aspirin 81 MG chewable tablet Take 81 mg by mouth daily             No discharge procedures on file.     Discharge to: Home        Discharge Date: 2021      [facility] [place]

## 2021-03-19 ENCOUNTER — OFFICE VISIT (OUTPATIENT)
Dept: FAMILY MEDICINE CLINIC | Facility: CLINIC | Age: 41
End: 2021-03-19

## 2021-03-19 VITALS
SYSTOLIC BLOOD PRESSURE: 132 MMHG | DIASTOLIC BLOOD PRESSURE: 62 MMHG | TEMPERATURE: 98.3 F | OXYGEN SATURATION: 98 % | HEART RATE: 93 BPM | RESPIRATION RATE: 16 BRPM | WEIGHT: 315 LBS | HEIGHT: 71 IN | BODY MASS INDEX: 44.1 KG/M2

## 2021-03-19 DIAGNOSIS — F33.40 RECURRENT MAJOR DEPRESSIVE DISORDER, IN REMISSION (HCC): ICD-10-CM

## 2021-03-19 DIAGNOSIS — R60.0 BILATERAL EDEMA OF LOWER EXTREMITY: Primary | ICD-10-CM

## 2021-03-19 DIAGNOSIS — G47.33 OBSTRUCTIVE SLEEP APNEA, ADULT: ICD-10-CM

## 2021-03-19 PROCEDURE — 99213 OFFICE O/P EST LOW 20 MIN: CPT | Performed by: FAMILY MEDICINE

## 2021-03-19 RX ORDER — VENLAFAXINE HYDROCHLORIDE 75 MG/1
75 CAPSULE, EXTENDED RELEASE ORAL DAILY
Qty: 90 CAPSULE | Refills: 1 | Status: SHIPPED | OUTPATIENT
Start: 2021-03-19 | End: 2021-07-02 | Stop reason: SDUPTHER

## 2021-03-19 RX ORDER — SEMAGLUTIDE 1.34 MG/ML
1 INJECTION, SOLUTION SUBCUTANEOUS WEEKLY
COMMUNITY
Start: 2021-02-07

## 2021-03-19 NOTE — PROGRESS NOTES
Subjective   Paul Medina is a 40 y.o. male.     History of Present Illness     40-year-old male presents today for 6-month follow-up on depression lymphedema, lower extremity edema.     Depression: Effexor 75 mg XR once a day.  Continues to control symptoms well.  Denies suicidal hospitalization or self injury.  Needs refill.     CHRISSY: Has not yet scheduled follow-up with me in sleep clinic. Home sleep study July 2019 showed severe sleep apnea with overall AHI of 73.2.  Strongly advised patient to follow-up with me in the sleep clinic; has not been using CPAP machine because mask is uncomfortable.  Discussed that we can try different masks and pressure settings.     Type 1 diabetes mellitus: Follows with endocrinology. Labs 4/7/21 and appointment on 4/14/21.  Patient to have endocrinologist annual labs.    Bilateral lower extremity edema under control with Lasix 40 mg once a day.  Is not needing to wrap his legs.    PHQ-2/PHQ-9 Depression Screening 3/19/2021   Little interest or pleasure in doing things 0   Feeling down, depressed, or hopeless 0   Total Score 0       The following portions of the patient's history were reviewed and updated as appropriate: allergies, current medications, past family history, past medical history, past social history, past surgical history and problem list.    Review of Systems   Constitutional: Negative for chills and fever.   Respiratory: Negative for cough and shortness of breath.    Cardiovascular: Negative for chest pain, palpitations and leg swelling.   Gastrointestinal: Negative for abdominal pain, nausea and vomiting.       Objective   Physical Exam  Constitutional:       Appearance: He is well-developed.   HENT:      Head: Normocephalic and atraumatic.   Eyes:      Pupils: Pupils are equal, round, and reactive to light.   Cardiovascular:      Rate and Rhythm: Normal rate and regular rhythm.      Heart sounds: Normal heart sounds. No murmur heard.     Pulmonary:       Effort: Pulmonary effort is normal. No respiratory distress.      Breath sounds: Normal breath sounds. No stridor. No wheezing or rales.   Musculoskeletal:      Cervical back: Normal range of motion and neck supple.   Skin:     General: Skin is warm.   Neurological:      Mental Status: He is alert and oriented to person, place, and time.   Psychiatric:         Behavior: Behavior normal.           No results found for: COLORU, CLARITYU, SPECGRAV, PHUR, LEUKOCYTESUR, NITRITE, PROTEINPOCUA, GLUCOSEUR, KETONESU, UROBILINOGEN, BILIRUBINUR, RBCUR      Assessment/Plan     Diagnoses and all orders for this visit:    1. Bilateral edema of lower extremity (Primary)    2. Obstructive sleep apnea, adult    3. Recurrent major depressive disorder, in remission (CMS/HCC)  -     venlafaxine XR (EFFEXOR-XR) 75 MG 24 hr capsule; Take 1 capsule by mouth Daily.  Dispense: 90 capsule; Refill: 1      Continue care per endocrinologist; I will need labs results which will be done in a couple of weeks.  Return to clinic in 6 months for follow-up or sooner if needed.  Follow-up with me in the sleep lab sooner.  Patient expressed understanding of the plan.

## 2021-07-02 DIAGNOSIS — F33.40 RECURRENT MAJOR DEPRESSIVE DISORDER, IN REMISSION (HCC): ICD-10-CM

## 2021-07-02 NOTE — TELEPHONE ENCOUNTER
Caller: Paul Medina    Relationship: Self    Best call back number: [phone]    Medication needed:   Requested Prescriptions     Pending Prescriptions Disp Refills   • venlafaxine XR (EFFEXOR-XR) 75 MG 24 hr capsule 90 capsule 1     Sig: Take 1 capsule by mouth Daily.       When do you need the refill by:SUNDAY    What is the patient's preferred pharmacy: [facility] [address] AT [address] [place] [address] [phone] [place] [phone]

## 2021-07-06 RX ORDER — VENLAFAXINE HYDROCHLORIDE 75 MG/1
75 CAPSULE, EXTENDED RELEASE ORAL DAILY
Qty: 90 CAPSULE | Refills: 0 | Status: SHIPPED | OUTPATIENT
Start: 2021-07-06 | End: 2021-10-14 | Stop reason: SDUPTHER

## 2021-10-14 DIAGNOSIS — F33.40 RECURRENT MAJOR DEPRESSIVE DISORDER, IN REMISSION (HCC): ICD-10-CM

## 2021-10-14 RX ORDER — VENLAFAXINE HYDROCHLORIDE 75 MG/1
75 CAPSULE, EXTENDED RELEASE ORAL DAILY
Qty: 30 CAPSULE | Refills: 0 | Status: SHIPPED | OUTPATIENT
Start: 2021-10-14 | End: 2021-11-29 | Stop reason: SDUPTHER

## 2021-10-14 NOTE — TELEPHONE ENCOUNTER
Caller: Paul Medina    Relationship: Self      Medication requested (name and dosage):   venlafaxine XR (EFFEXOR-XR) 75 MG 24 hr capsule    Pharmacy where request should be sent:   [facility] [address] AT [address] [place] [address] - [phone]  - [phone]   [phone]        Additional details provided by patient: MAKING APPT FOR NEW PATIENT .. JUST NEEDS ENOUGH TO GET BY AT THE MOMENT     Best call back number: [phone] (M)    Does the patient have less than a 3 day supply:  [x] Yes  [] No    Octaviano Stack Rep   10/14/21 09:01 EDT

## 2021-11-29 ENCOUNTER — OFFICE VISIT (OUTPATIENT)
Dept: FAMILY MEDICINE CLINIC | Facility: CLINIC | Age: 41
End: 2021-11-29

## 2021-11-29 VITALS
HEIGHT: 71 IN | DIASTOLIC BLOOD PRESSURE: 72 MMHG | SYSTOLIC BLOOD PRESSURE: 128 MMHG | WEIGHT: 315 LBS | BODY MASS INDEX: 44.1 KG/M2

## 2021-11-29 DIAGNOSIS — F33.40 RECURRENT MAJOR DEPRESSIVE DISORDER, IN REMISSION (HCC): ICD-10-CM

## 2021-11-29 DIAGNOSIS — E11.8 TYPE 2 DIABETES MELLITUS WITH COMPLICATION, WITH LONG-TERM CURRENT USE OF INSULIN (HCC): Primary | Chronic | ICD-10-CM

## 2021-11-29 DIAGNOSIS — R60.0 BILATERAL EDEMA OF LOWER EXTREMITY: Chronic | ICD-10-CM

## 2021-11-29 DIAGNOSIS — Z79.4 TYPE 2 DIABETES MELLITUS WITH COMPLICATION, WITH LONG-TERM CURRENT USE OF INSULIN (HCC): Primary | Chronic | ICD-10-CM

## 2021-11-29 PROBLEM — M19.90 ARTHRITIS: Status: ACTIVE | Noted: 2021-11-29

## 2021-11-29 PROBLEM — E53.8 FOLIC ACID DEFICIENCY: Status: ACTIVE | Noted: 2021-04-14

## 2021-11-29 PROBLEM — F32.A DEPRESSIVE DISORDER: Status: ACTIVE | Noted: 2021-11-29

## 2021-11-29 PROBLEM — G47.30 SLEEP APNEA: Status: ACTIVE | Noted: 2021-04-14

## 2021-11-29 PROBLEM — Z79.899 ENCOUNTER FOR LONG-TERM (CURRENT) USE OF OTHER MEDICATIONS: Status: ACTIVE | Noted: 2021-04-14

## 2021-11-29 PROBLEM — K21.9 GASTROESOPHAGEAL REFLUX DISEASE: Status: ACTIVE | Noted: 2021-11-29

## 2021-11-29 PROCEDURE — 99213 OFFICE O/P EST LOW 20 MIN: CPT | Performed by: INTERNAL MEDICINE

## 2021-11-29 RX ORDER — VENLAFAXINE HYDROCHLORIDE 75 MG/1
75 CAPSULE, EXTENDED RELEASE ORAL DAILY
Qty: 30 CAPSULE | Refills: 0 | Status: CANCELLED | OUTPATIENT
Start: 2021-11-29

## 2021-11-29 RX ORDER — VENLAFAXINE HYDROCHLORIDE 75 MG/1
75 CAPSULE, EXTENDED RELEASE ORAL DAILY
Qty: 30 CAPSULE | Refills: 0 | Status: SHIPPED | OUTPATIENT
Start: 2021-11-29 | End: 2022-05-04

## 2021-11-29 NOTE — PROGRESS NOTES
11/29/2021    CC: Depression ([facility])  .        HPI  Diabetes  He presents for his follow-up diabetic visit. He has type 1 diabetes mellitus. His disease course has been stable. There are no hypoglycemic associated symptoms. There are no diabetic associated symptoms. There are no hypoglycemic complications. Symptoms are stable. There are no diabetic complications.        Subjective   Paul Medina is a 41 y.o. male.      The following portions of the patient's history were reviewed and updated as appropriate: allergies, current medications, past family history, past medical history, past social history, past surgical history and problem list.    Problem List  Patient Active Problem List   Diagnosis   • Class 3 severe obesity in adult (HCC)   • Type 1 diabetes mellitus (HCC)   • Secondary lymphedema   • Obstructive sleep apnea, adult   • Chronic bilateral low back pain without sciatica   • Arthritis   • Depressive disorder   • Encounter for long-term (current) use of other medications   • Folic acid deficiency   • Gastroesophageal reflux disease   • Sleep apnea       Past Medical History  Past Medical History:   Diagnosis Date   • Anxiety    • Arthritis    • Cellulitis 5/10/2019   • Depression    • Diabetes mellitus ([facility])    • GERD (gastroesophageal reflux disease)        Surgical History  Past Surgical History:   Procedure Laterality Date   • CARPAL TUNNEL RELEASE Bilateral    • CIRCUMCISION     • TRIGGER FINGER RELEASE Left     THUMB       Family History  Family History   Problem Relation Age of Onset   • Thyroid disease Mother    • Anxiety disorder Mother    • Diabetes Father    • Stroke Father        Social History  Social History    Tobacco Use      Smoking status: Never Smoker      Smokeless tobacco: Never Used      Tobacco comment: Caffeine use 6 diet cokes daily       Is the Patient a current tobacco user? No    Allergies  Allergies   Allergen Reactions   • Penicillins Unknown (See Comments)      childhood  Mild rash as child    Other reaction(s): Unknown (See Comments)  childhood  Mild rash as child    Other reaction(s): Unknown (See Comments)  childhood       Current Medications    Current Outpatient Medications:   •  ACCU-CHEK GUIDE test strip, , Disp: , Rfl:   •  B-D ULTRAFINE III SHORT PEN 31G X 8 MM misc, , Disp: , Rfl:   •  esomeprazole (nexIUM) 20 MG capsule, Take 20 mg by mouth Every Morning Before Breakfast., Disp: , Rfl:   •  furosemide (LASIX) 40 MG tablet, Take 1 tablet by mouth Daily. Take 2 tablets every morning and 1 tablet every evening (Patient taking differently: Take 40 mg by mouth Daily.), Disp: 90 tablet, Rfl: 1  •  HUMALOG 100 UNIT/ML solution cartridge, , Disp: , Rfl:   •  Insulin Glargine (TOUJEO SOLOSTAR SC), Inject 72 Units under the skin into the appropriate area as directed., Disp: , Rfl:   •  metFORMIN ER (GLUCOPHAGE-XR) 500 MG 24 hr tablet, Take 500 mg by mouth Daily With Breakfast., Disp: , Rfl:   •  Ozempic, 1 MG/DOSE, 2 MG/1.5ML solution pen-injector, Inject 1 mg under the skin into the appropriate area as directed 1 (One) Time Per Week., Disp: , Rfl:   •  venlafaxine XR (EFFEXOR-XR) 75 MG 24 hr capsule, Take 1 capsule by mouth Daily., Disp: 30 capsule, Rfl: 0     Review of System  Review of Systems   Constitutional: Negative.    Eyes: Negative.    Respiratory: Negative.    Cardiovascular: Negative.    Endocrine: Negative.      I have reviewed and confirmed the accuracy of the ROS as documented by the MA/LPN/RN Niko Huggins MD    Vitals:    11/29/21 1448   BP: 128/72     Body mass index is 58.35 kg/m².    Objective     Physical Exam  Physical Exam  Constitutional:       Appearance: Normal appearance.   Cardiovascular:      Rate and Rhythm: Regular rhythm.      Pulses: Normal pulses.      Heart sounds: Normal heart sounds.   Pulmonary:      Breath sounds: Normal breath sounds.   Musculoskeletal:      Cervical back: Neck supple.   Neurological:      Mental Status: He is  alert.         Assessment/Plan        Mr. Medina is a 41-year-old patient who presents at this time to get established with us as primary care.  He has been seen in the past by Dr. Elizabeth who is relocating her practice.  Patient has had a long history of diabetes mellitus type 1 is currently being seen by  endocrinologist.  He also has a long history of well-controlled depression and is currently with venlafaxine 75 mg a day.  He relates he is feeling fine and having had no particular problems in the intervening weeks.  Note is made his BMI is 58.4.    His blood pressure is well controlled at 128/72 in the left arm sitting position standard cuff.    We will see the patient back for physical examination in the next several weeks.  He is due for a laboratory test with Dr. Pineda in the next week or so and the patient will forward those results to us.      Diagnoses and all orders for this visit:    1. Recurrent major depressive disorder, in remission (HCC)    2. Bilateral edema of lower extremity      Plan:  1.)  Follow-up with physical examination in the next few weeks.  2.)  Renew Effexor       Niko Huggins MD  11/29/2021

## 2022-02-25 ENCOUNTER — OFFICE VISIT (OUTPATIENT)
Dept: FAMILY MEDICINE CLINIC | Facility: CLINIC | Age: 42
End: 2022-02-25

## 2022-02-25 VITALS
RESPIRATION RATE: 16 BRPM | HEIGHT: 71 IN | SYSTOLIC BLOOD PRESSURE: 118 MMHG | BODY MASS INDEX: 44.1 KG/M2 | DIASTOLIC BLOOD PRESSURE: 70 MMHG | WEIGHT: 315 LBS

## 2022-02-25 DIAGNOSIS — F34.1 DYSTHYMIA: ICD-10-CM

## 2022-02-25 DIAGNOSIS — E11.8 TYPE 2 DIABETES MELLITUS WITH COMPLICATION, WITH LONG-TERM CURRENT USE OF INSULIN: ICD-10-CM

## 2022-02-25 DIAGNOSIS — Z00.00 ANNUAL PHYSICAL EXAM: Primary | ICD-10-CM

## 2022-02-25 DIAGNOSIS — Z79.4 TYPE 2 DIABETES MELLITUS WITH COMPLICATION, WITH LONG-TERM CURRENT USE OF INSULIN: ICD-10-CM

## 2022-02-25 PROCEDURE — 99396 PREV VISIT EST AGE 40-64: CPT | Performed by: INTERNAL MEDICINE

## 2022-05-04 DIAGNOSIS — F33.40 RECURRENT MAJOR DEPRESSIVE DISORDER, IN REMISSION: ICD-10-CM

## 2022-05-04 RX ORDER — VENLAFAXINE HYDROCHLORIDE 75 MG/1
CAPSULE, EXTENDED RELEASE ORAL
Qty: 30 CAPSULE | Refills: 5 | Status: SHIPPED | OUTPATIENT
Start: 2022-05-04 | End: 2022-05-20 | Stop reason: SDUPTHER

## 2022-05-20 ENCOUNTER — OFFICE VISIT (OUTPATIENT)
Dept: FAMILY MEDICINE CLINIC | Facility: CLINIC | Age: 42
End: 2022-05-20

## 2022-05-20 VITALS
BODY MASS INDEX: 44.1 KG/M2 | DIASTOLIC BLOOD PRESSURE: 84 MMHG | RESPIRATION RATE: 16 BRPM | WEIGHT: 315 LBS | SYSTOLIC BLOOD PRESSURE: 136 MMHG | HEIGHT: 71 IN

## 2022-05-20 DIAGNOSIS — F33.40 RECURRENT MAJOR DEPRESSIVE DISORDER, IN REMISSION: ICD-10-CM

## 2022-05-20 PROCEDURE — 99214 OFFICE O/P EST MOD 30 MIN: CPT | Performed by: INTERNAL MEDICINE

## 2022-05-23 RX ORDER — VENLAFAXINE HYDROCHLORIDE 75 MG/1
75 CAPSULE, EXTENDED RELEASE ORAL DAILY
Qty: 90 CAPSULE | Refills: 1 | Status: SHIPPED | OUTPATIENT
Start: 2022-05-23 | End: 2023-01-11 | Stop reason: SDUPTHER

## 2023-01-11 DIAGNOSIS — F33.40 RECURRENT MAJOR DEPRESSIVE DISORDER, IN REMISSION: ICD-10-CM

## 2023-01-11 RX ORDER — VENLAFAXINE HYDROCHLORIDE 75 MG/1
75 CAPSULE, EXTENDED RELEASE ORAL DAILY
Qty: 90 CAPSULE | Refills: 1 | Status: SHIPPED | OUTPATIENT
Start: 2023-01-11

## 2023-01-11 NOTE — TELEPHONE ENCOUNTER
Caller: Paul Medina    Relationship: Self    Best call back number: [phone]    Requested Prescriptions:   Requested Prescriptions     Pending Prescriptions Disp Refills   • venlafaxine XR (EFFEXOR-XR) 75 MG 24 hr capsule 90 capsule 1     Sig: Take 1 capsule by mouth Daily.        Pharmacy where request should be sent: [facility] [id number] [address] AT [address] [place] [address] [phone] [facility] [phone] FX     Additional details provided by patient: PATIENT IS OUT OF MEDICATION    Does the patient have less than a 3 day supply:  [] Yes  [x] No    Would you like a call back once the refill request has been completed: [x] Yes [] No    If the office needs to give you a call back, can they leave a voicemail: [x] Yes [] No    Octaviano Lawton Rep   01/11/23 13:53 EST

## 2023-03-13 ENCOUNTER — OFFICE VISIT (OUTPATIENT)
Dept: FAMILY MEDICINE CLINIC | Facility: CLINIC | Age: 43
End: 2023-03-13
Payer: COMMERCIAL

## 2023-03-13 VITALS
HEIGHT: 71 IN | HEART RATE: 75 BPM | TEMPERATURE: 98.1 F | BODY MASS INDEX: 44.1 KG/M2 | OXYGEN SATURATION: 97 % | DIASTOLIC BLOOD PRESSURE: 90 MMHG | SYSTOLIC BLOOD PRESSURE: 130 MMHG | WEIGHT: 315 LBS

## 2023-03-13 DIAGNOSIS — R21 LOCALIZED MACULAR RASH: Primary | ICD-10-CM

## 2023-03-13 PROBLEM — Z92.29 HISTORY OF VACCINATION: Status: ACTIVE | Noted: 2023-03-13

## 2023-03-13 PROBLEM — F90.9 ATTENTION DEFICIT HYPERACTIVITY DISORDER (ADHD): Status: ACTIVE | Noted: 2023-03-13

## 2023-03-13 PROCEDURE — 99213 OFFICE O/P EST LOW 20 MIN: CPT | Performed by: INTERNAL MEDICINE

## 2023-03-13 RX ORDER — ROSUVASTATIN CALCIUM 10 MG/1
10 TABLET, COATED ORAL DAILY
Qty: 30 TABLET | Refills: 11 | COMMUNITY
Start: 2022-12-27 | End: 2023-12-27

## 2023-03-13 RX ORDER — TRIAMCINOLONE ACETONIDE 5 MG/G
1 OINTMENT TOPICAL 2 TIMES DAILY
Qty: 15 G | Refills: 1 | Status: SHIPPED | OUTPATIENT
Start: 2023-03-13

## 2023-03-16 NOTE — PROGRESS NOTES
03/13/2023 Answers for HPI/ROS submitted by the patient on 3/13/2023  What is the primary reason for your visit?: Other  Please describe your symptoms.: General check-up visit, however, I have been having red, bumpy rash with itching around forearms and knees  Have you had these symptoms before?: No  How long have you been having these symptoms?: 5-7 days        Assessment & Plan      This 42-year-old patient presents today for evaluation of rash along on the arms elbows and knees he relates that started about a week or so ago.  It is faintly pruritic.  There are no diagnoses linked to this encounter.         CC: Rash (Bilat arms, elbows, knees- started about 1 week ago---no other issues)  .        HPI  History of Present Illness  Patient is here for evaluation of a rash along both arms at the elbows knees x1 week or so.  He has not had previous episodes of this type.       Rosaura Medina is a 42 y.o. male.      The following portions of the patient's history were reviewed and updated as appropriate: allergies, current medications, past family history, past medical history, past social history, past surgical history and problem list.    Problem List  Patient Active Problem List   Diagnosis   • Class 3 severe obesity in adult (HCC)   • Type 1 diabetes mellitus (HCC)   • Secondary lymphedema   • Obstructive sleep apnea, adult   • Chronic bilateral low back pain without sciatica   • Arthritis   • Depressive disorder   • Encounter for long-term (current) use of other medications   • Folic acid deficiency   • Gastroesophageal reflux disease   • Sleep apnea   • Attention deficit hyperactivity disorder (ADHD)   • History of vaccination       Past Medical History  Past Medical History:   Diagnosis Date   • ADHD (attention deficit hyperactivity disorder) 1990   • Anxiety    • Arthritis    • Cellulitis 05/10/2019   • Depression    • Diabetes mellitus (HCC)    • GERD (gastroesophageal reflux disease)    •  Obesity        Surgical History  Past Surgical History:   Procedure Laterality Date   • CARPAL TUNNEL RELEASE Bilateral    • CIRCUMCISION     • TRIGGER FINGER RELEASE Left     THUMB       Family History  Family History   Problem Relation Age of Onset   • Thyroid disease Mother    • Anxiety disorder Mother    • Diabetes Father    • Stroke Father        Social History  Social History    Tobacco Use      Smoking status: Never      Smokeless tobacco: Never      Tobacco comments: Caffeine use 6 diet cokes daily       Is the Patient a current tobacco user? No    Allergies  Allergies   Allergen Reactions   • Penicillins Unknown (See Comments)     childhood  Mild rash as child    Other reaction(s): Unknown (See Comments)  childhood  Mild rash as child    Other reaction(s): Unknown (See Comments)  childhood       Current Medications    Current Outpatient Medications:   •  ACCU-CHEK GUIDE test strip, , Disp: , Rfl:   •  B-D ULTRAFINE III SHORT PEN 31G X 8 MM misc, , Disp: , Rfl:   •  esomeprazole (nexIUM) 20 MG capsule, Take 1 capsule by mouth Every Morning Before Breakfast., Disp: , Rfl:   •  HUMALOG 100 UNIT/ML solution cartridge, , Disp: , Rfl:   •  Insulin Glargine (TOUJEO SOLOSTAR SC), Inject 70 Units under the skin into the appropriate area as directed., Disp: , Rfl:   •  metFORMIN ER (GLUCOPHAGE-XR) 500 MG 24 hr tablet, Take 1 tablet by mouth Daily With Breakfast., Disp: , Rfl:   •  Ozempic, 1 MG/DOSE, 2 MG/1.5ML solution pen-injector, Inject 1 mg under the skin into the appropriate area as directed 1 (One) Time Per Week., Disp: , Rfl:   •  rosuvastatin (CRESTOR) 10 MG tablet, Take 1 tablet by mouth Daily., Disp: 30 tablet, Rfl: 11  •  venlafaxine XR (EFFEXOR-XR) 75 MG 24 hr capsule, Take 1 capsule by mouth Daily., Disp: 90 capsule, Rfl: 1  •  triamcinolone (KENALOG) 0.5 % ointment, Apply 1 application topically to the appropriate area as directed 2 (Two) Times a Day., Disp: 15 g, Rfl: 1     Review of System  Review of  Systems   Constitutional: Negative for chills and fever.   Respiratory: Negative for cough and shortness of breath.    Cardiovascular: Negative for chest pain and palpitations.   Gastrointestinal: Negative for constipation, diarrhea, nausea and vomiting.   Skin: Positive for rash.   Neurological: Negative for dizziness and headache.     I have reviewed and confirmed the accuracy of the ROS as documented by the MA/LPN/RN Niko Huggins MD    Vitals:    03/13/23 1523   BP: 130/90   Pulse: 75   Temp: 98.1 °F (36.7 °C)   SpO2: 97%     Body mass index is 57.88 kg/m².    Objective     Physical Exam  Physical Exam  Constitutional:       General: He is not in acute distress.     Appearance: Normal appearance.   HENT:      Head: Normocephalic and atraumatic.   Cardiovascular:      Rate and Rhythm: Normal rate and regular rhythm.   Pulmonary:      Effort: Pulmonary effort is normal. No respiratory distress.      Breath sounds: Normal breath sounds. No wheezing, rhonchi or rales.   Skin:     General: Skin is warm and dry.      Findings: Rash present.      Comments: 3 areas of small macular rash about 3 cm in diameter along both elbows and the forearm noted.   Neurological:      General: No focal deficit present.      Mental Status: He is alert and oriented to person, place, and time.   Psychiatric:         Mood and Affect: Mood normal.         Behavior: Behavior normal.         Thought Content: Thought content normal.         Judgment: Judgment normal.       Plan:  1.)  Triamcinolone cream 0.5% to be applied to the rash.  If no improvement the patient is notify me.      Niko Huggins MD  03/13/2023

## 2023-06-01 ENCOUNTER — OFFICE VISIT (OUTPATIENT)
Dept: FAMILY MEDICINE CLINIC | Facility: CLINIC | Age: 43
End: 2023-06-01

## 2023-06-01 VITALS
BODY MASS INDEX: 44.1 KG/M2 | HEART RATE: 72 BPM | OXYGEN SATURATION: 98 % | HEIGHT: 71 IN | WEIGHT: 315 LBS | SYSTOLIC BLOOD PRESSURE: 120 MMHG | DIASTOLIC BLOOD PRESSURE: 70 MMHG

## 2023-06-01 DIAGNOSIS — R35.1 NOCTURIA: ICD-10-CM

## 2023-06-01 DIAGNOSIS — E11.8 TYPE 2 DIABETES MELLITUS WITH COMPLICATION, WITH LONG-TERM CURRENT USE OF INSULIN: ICD-10-CM

## 2023-06-01 DIAGNOSIS — Z13.6 SCREENING FOR HYPERTENSION: Primary | ICD-10-CM

## 2023-06-01 DIAGNOSIS — Z23 NEED FOR PNEUMOCOCCAL VACCINE: ICD-10-CM

## 2023-06-01 DIAGNOSIS — Z79.4 TYPE 2 DIABETES MELLITUS WITH COMPLICATION, WITH LONG-TERM CURRENT USE OF INSULIN: ICD-10-CM

## 2023-06-01 DIAGNOSIS — Z13.220 SCREENING FOR HYPERLIPIDEMIA: ICD-10-CM

## 2023-06-01 DIAGNOSIS — Z13.29 SCREENING FOR HYPOTHYROIDISM: ICD-10-CM

## 2023-06-01 DIAGNOSIS — Z13.0 SCREENING FOR DEFICIENCY ANEMIA: ICD-10-CM

## 2023-06-01 DIAGNOSIS — Z00.00 NORMAL PHYSICAL EXAMINATION: ICD-10-CM

## 2023-06-01 PROBLEM — E78.5 HYPERLIPIDEMIA: Status: ACTIVE | Noted: 2023-03-17

## 2023-06-01 NOTE — PROGRESS NOTES
06/01/2023    Assessment & Plan      This 42-year-old patient presents today for physical examination.  He relates he is feeling fine having had no problems in the interim of visits.  He has prior history of diabetes mellitus type 2.  He relates he is taking his medication as prescribed.      Patient's blood pressure is 120/70 left arm sitting position large cuff.  His BMI is elevated at 58.2.    Regarding anticipatory guidance: We discussed the importance of keeping his LDL cholesterol less than 70.  He currently is on Crestor 10 mg p.o. daily.  I gave him a low-cholesterol diet sheet with instructions to start that at our direction.      Part of this note may be an electronic transcription/translation of spoken language to printed text using the Dragon Diictation System.  Some errors may exist even though the document was edited.        Diagnoses and all orders for this visit:    1. Screening for hypertension (Primary)  -     Comprehensive Metabolic Panel  -     Hepatitis C Antibody    2. Screening for hyperlipidemia  -     Lipid Panel With / Chol / HDL Ratio    3. Nocturia  -     Urinalysis With Culture If Indicated -    4. Screening for deficiency anemia  -     CBC & Differential    5. Screening for hypothyroidism  -     T4, Free  -     TSH    6. Type 2 diabetes mellitus with complication, with long-term current use of insulin  -     MicroAlbumin, Urine, Random - Urine, Clean Catch; Future  -     Hemoglobin A1c    7. Normal physical examination             CC: Annual Exam (No other issues)  .        HPI  History of Present Illness     Subjective   Paul Medina is a 42 y.o. male.      The following portions of the patient's history were reviewed and updated as appropriate: allergies, current medications, past family history, past medical history, past social history, past surgical history and problem list.    Problem List  Patient Active Problem List   Diagnosis   • Class 3 severe obesity in adult   • Type 1  diabetes mellitus   • Secondary lymphedema   • Obstructive sleep apnea, adult   • Chronic bilateral low back pain without sciatica   • Arthritis   • Depressive disorder   • Encounter for long-term (current) use of other medications   • Folic acid deficiency   • Gastroesophageal reflux disease   • Sleep apnea   • Attention deficit hyperactivity disorder (ADHD)   • History of vaccination   • Hyperlipidemia       Past Medical History  Past Medical History:   Diagnosis Date   • ADHD (attention deficit hyperactivity disorder) 1990   • Anxiety    • Arthritis    • Cellulitis 05/10/2019   • Depression    • Diabetes mellitus    • GERD (gastroesophageal reflux disease)    • Obesity        Surgical History  Past Surgical History:   Procedure Laterality Date   • CARPAL TUNNEL RELEASE Bilateral    • CIRCUMCISION     • TRIGGER FINGER RELEASE Left     THUMB       Family History  Family History   Problem Relation Age of Onset   • Thyroid disease Mother    • Anxiety disorder Mother    • Diabetes Father    • Stroke Father        Social History  Social History    Tobacco Use      Smoking status: Never      Smokeless tobacco: Never      Tobacco comments: Caffeine use 6 diet cokes daily       Is the Patient a current tobacco user? No    Allergies  Allergies   Allergen Reactions   • Penicillins Unknown (See Comments)     childhood  Mild rash as child    Other reaction(s): Unknown (See Comments)  childhood  Mild rash as child    Other reaction(s): Unknown (See Comments)  childhood       Current Medications    Current Outpatient Medications:   •  ACCU-CHEK GUIDE test strip, , Disp: , Rfl:   •  B-D ULTRAFINE III SHORT PEN 31G X 8 MM misc, , Disp: , Rfl:   •  esomeprazole (nexIUM) 20 MG capsule, Take 1 capsule by mouth Every Morning Before Breakfast., Disp: , Rfl:   •  HUMALOG 100 UNIT/ML solution cartridge, , Disp: , Rfl:   •  Insulin Glargine (TOUJEO SOLOSTAR SC), Inject 70 Units under the skin into the appropriate area as directed., Disp:  , Rfl:   •  metFORMIN ER (GLUCOPHAGE-XR) 500 MG 24 hr tablet, Take 1 tablet by mouth Daily With Breakfast., Disp: , Rfl:   •  rosuvastatin (CRESTOR) 10 MG tablet, Take 1 tablet by mouth Daily., Disp: 30 tablet, Rfl: 11  •  venlafaxine XR (EFFEXOR-XR) 75 MG 24 hr capsule, Take 1 capsule by mouth Daily., Disp: 90 capsule, Rfl: 1  •  Ozempic, 1 MG/DOSE, 2 MG/1.5ML solution pen-injector, Inject 1 mg under the skin into the appropriate area as directed 1 (One) Time Per Week. (Patient not taking: Reported on 6/1/2023), Disp: , Rfl:   •  triamcinolone (KENALOG) 0.5 % ointment, Apply 1 application topically to the appropriate area as directed 2 (Two) Times a Day. (Patient not taking: Reported on 6/1/2023), Disp: 15 g, Rfl: 1     Review of System  Review of Systems   Constitutional: Negative.    HENT: Negative.    Eyes: Negative.    Respiratory: Negative.    Cardiovascular: Negative.    Gastrointestinal: Negative.    Endocrine: Negative.    Genitourinary: Negative.    Musculoskeletal: Negative.    Skin: Negative.    Allergic/Immunologic: Negative.    Neurological: Negative.    Hematological: Negative.    Psychiatric/Behavioral: Negative.      I have reviewed and confirmed the accuracy of the ROS as documented by the MA/LPN/RN Niko Huggins MD    Vitals:    06/01/23 1439   BP: 120/70   Pulse: 72   SpO2: 98%     Body mass index is 58.16 kg/m².    Objective     Physical Exam  Physical Exam  Vitals and nursing note reviewed.   Constitutional:       Appearance: He is well-developed.   HENT:      Head: Normocephalic and atraumatic.   Eyes:      Conjunctiva/sclera: Conjunctivae normal.   Cardiovascular:      Rate and Rhythm: Normal rate and regular rhythm.      Heart sounds: Normal heart sounds.   Pulmonary:      Effort: Pulmonary effort is normal.      Breath sounds: Normal breath sounds.   Abdominal:      General: Bowel sounds are normal.      Palpations: Abdomen is soft.   Musculoskeletal:         General: Normal range of  motion.      Cervical back: Normal range of motion and neck supple.   Skin:     General: Skin is warm and dry.   Neurological:      Mental Status: He is alert and oriented to person, place, and time.   Psychiatric:         Behavior: Behavior normal.             Niko Huggins MD  06/01/2023

## 2023-06-06 LAB
ALBUMIN SERPL-MCNC: 4.2 G/DL (ref 4–5)
ALBUMIN/GLOB SERPL: 1.8 {RATIO} (ref 1.2–2.2)
ALP SERPL-CCNC: 121 IU/L (ref 44–121)
ALT SERPL-CCNC: 28 IU/L (ref 0–44)
AST SERPL-CCNC: 22 IU/L (ref 0–40)
BASOPHILS # BLD AUTO: 0.1 X10E3/UL (ref 0–0.2)
BASOPHILS NFR BLD AUTO: 1 %
BILIRUB SERPL-MCNC: 0.8 MG/DL (ref 0–1.2)
BUN SERPL-MCNC: 13 MG/DL (ref 6–24)
BUN/CREAT SERPL: 17 (ref 9–20)
CALCIUM SERPL-MCNC: 9.1 MG/DL (ref 8.7–10.2)
CHLORIDE SERPL-SCNC: 102 MMOL/L (ref 96–106)
CHOLEST SERPL-MCNC: 147 MG/DL (ref 100–199)
CHOLEST/HDLC SERPL: 1.9 RATIO (ref 0–5)
CO2 SERPL-SCNC: 22 MMOL/L (ref 20–29)
CREAT SERPL-MCNC: 0.78 MG/DL (ref 0.76–1.27)
EGFRCR SERPLBLD CKD-EPI 2021: 115 ML/MIN/1.73
EOSINOPHIL # BLD AUTO: 0.5 X10E3/UL (ref 0–0.4)
EOSINOPHIL NFR BLD AUTO: 7 %
ERYTHROCYTE [DISTWIDTH] IN BLOOD BY AUTOMATED COUNT: 13.1 % (ref 11.6–15.4)
GLOBULIN SER CALC-MCNC: 2.4 G/DL (ref 1.5–4.5)
GLUCOSE SERPL-MCNC: 202 MG/DL (ref 70–99)
HBA1C MFR BLD: 7.8 % (ref 4.8–5.6)
HCT VFR BLD AUTO: 44 % (ref 37.5–51)
HCV IGG SERPL QL IA: NON REACTIVE
HDLC SERPL-MCNC: 77 MG/DL
HGB BLD-MCNC: 14.2 G/DL (ref 13–17.7)
IMM GRANULOCYTES # BLD AUTO: 0.1 X10E3/UL (ref 0–0.1)
IMM GRANULOCYTES NFR BLD AUTO: 1 %
LDLC SERPL CALC-MCNC: 57 MG/DL (ref 0–99)
LYMPHOCYTES # BLD AUTO: 1.4 X10E3/UL (ref 0.7–3.1)
LYMPHOCYTES NFR BLD AUTO: 19 %
MCH RBC QN AUTO: 27.4 PG (ref 26.6–33)
MCHC RBC AUTO-ENTMCNC: 32.3 G/DL (ref 31.5–35.7)
MCV RBC AUTO: 85 FL (ref 79–97)
MICROALBUMIN UR-MCNC: 3.9 UG/ML
MONOCYTES # BLD AUTO: 0.4 X10E3/UL (ref 0.1–0.9)
MONOCYTES NFR BLD AUTO: 6 %
NEUTROPHILS # BLD AUTO: 4.8 X10E3/UL (ref 1.4–7)
NEUTROPHILS NFR BLD AUTO: 66 %
PLATELET # BLD AUTO: 304 X10E3/UL (ref 150–450)
POTASSIUM SERPL-SCNC: 4.7 MMOL/L (ref 3.5–5.2)
PROT SERPL-MCNC: 6.6 G/DL (ref 6–8.5)
RBC # BLD AUTO: 5.18 X10E6/UL (ref 4.14–5.8)
SODIUM SERPL-SCNC: 139 MMOL/L (ref 134–144)
T4 FREE SERPL-MCNC: 1.08 NG/DL (ref 0.82–1.77)
TRIGL SERPL-MCNC: 63 MG/DL (ref 0–149)
TSH SERPL DL<=0.005 MIU/L-ACNC: 1.37 UIU/ML (ref 0.45–4.5)
VLDLC SERPL CALC-MCNC: 13 MG/DL (ref 5–40)
WBC # BLD AUTO: 7.3 X10E3/UL (ref 3.4–10.8)

## 2023-09-01 ENCOUNTER — OFFICE VISIT (OUTPATIENT)
Dept: FAMILY MEDICINE CLINIC | Facility: CLINIC | Age: 43
End: 2023-09-01
Payer: COMMERCIAL

## 2023-09-01 VITALS — HEIGHT: 71 IN | BODY MASS INDEX: 58.16 KG/M2

## 2023-11-13 ENCOUNTER — OFFICE VISIT (OUTPATIENT)
Dept: FAMILY MEDICINE CLINIC | Facility: CLINIC | Age: 43
End: 2023-11-13
Payer: COMMERCIAL

## 2023-11-13 VITALS
DIASTOLIC BLOOD PRESSURE: 80 MMHG | SYSTOLIC BLOOD PRESSURE: 118 MMHG | WEIGHT: 315 LBS | BODY MASS INDEX: 44.1 KG/M2 | HEIGHT: 71 IN

## 2023-11-13 DIAGNOSIS — E11.8 TYPE 2 DIABETES MELLITUS WITH COMPLICATION, WITH LONG-TERM CURRENT USE OF INSULIN: Primary | ICD-10-CM

## 2023-11-13 DIAGNOSIS — F33.40 RECURRENT MAJOR DEPRESSIVE DISORDER, IN REMISSION: ICD-10-CM

## 2023-11-13 DIAGNOSIS — Z79.4 TYPE 2 DIABETES MELLITUS WITH COMPLICATION, WITH LONG-TERM CURRENT USE OF INSULIN: Primary | ICD-10-CM

## 2023-11-13 DIAGNOSIS — F33.0 MILD EPISODE OF RECURRENT MAJOR DEPRESSIVE DISORDER: ICD-10-CM

## 2023-11-13 PROCEDURE — 99214 OFFICE O/P EST MOD 30 MIN: CPT | Performed by: INTERNAL MEDICINE

## 2023-11-13 RX ORDER — VENLAFAXINE HYDROCHLORIDE 75 MG/1
75 CAPSULE, EXTENDED RELEASE ORAL DAILY
Qty: 90 CAPSULE | Refills: 1 | Status: SHIPPED | OUTPATIENT
Start: 2023-11-13

## 2023-11-13 RX ORDER — METFORMIN HYDROCHLORIDE 500 MG/1
TABLET, EXTENDED RELEASE ORAL 2 TIMES DAILY
COMMUNITY
Start: 2023-10-11

## 2023-11-13 RX ORDER — GLUCAGON 3 MG/1
POWDER NASAL
COMMUNITY
Start: 2023-10-11

## 2023-11-13 NOTE — PROGRESS NOTES
11/13/2023    Assessment & Plan     This pleasant 43-year-old presents today for follow-up of hypertension.  His blood pressure is well controlled at this point.  He has a history of diabetes mellitus type 2 and has been followed by [facility].  Currently is on Ozempic metformin insulin glargine for his diabetes control which she relates most recently had a hemoglobin of 7.2.  His blood pressure is well controlled at 118/80 with a large cuff.  No changes needed in his diabetic medication.    He is doing well with the venlafaxine which she has been on for approximately 8 years.  He is having no problems at work he relates.  Diagnoses and all orders for this visit:    1. Type 2 diabetes mellitus with complication, with long-term current use of insulin (Primary)    2. Recurrent major depressive disorder, in remission  -     venlafaxine XR (EFFEXOR-XR) 75 MG 24 hr capsule; Take 1 capsule by mouth Daily.  Dispense: 90 capsule; Refill: 1         Plan:  1.)  Follow-up in 6 months for reevaluation of depression    CC: Hypertension (F/U---no other issues)  .        HPI  Hypertension  Pertinent negatives include no chest pain, palpitations or shortness of breath.        Subjective   Paul Medina is a 43 y.o. male.      The following portions of the patient's history were reviewed and updated as appropriate: allergies, current medications, past family history, past medical history, past social history, past surgical history, and problem list.    Problem List  Patient Active Problem List   Diagnosis    Class 3 severe obesity in adult    Type 1 diabetes mellitus    Secondary lymphedema    Obstructive sleep apnea, adult    Chronic bilateral low back pain without sciatica    Arthritis    Depressive disorder    Encounter for long-term (current) use of other medications    Folic acid deficiency    Gastroesophageal reflux disease    Sleep apnea    Attention deficit hyperactivity disorder (ADHD)    History of vaccination     Hyperlipidemia       Past Medical History  Past Medical History:   Diagnosis Date    ADHD (attention deficit hyperactivity disorder) 1990    Anxiety     Arthritis     Cellulitis 05/10/2019    Depression     Diabetes mellitus     GERD (gastroesophageal reflux disease)     Obesity        Surgical History  Past Surgical History:   Procedure Laterality Date    CARPAL TUNNEL RELEASE Bilateral     CIRCUMCISION      TRIGGER FINGER RELEASE Left     THUMB       Family History  Family History   Problem Relation Age of Onset    Thyroid disease Mother     Anxiety disorder Mother     Diabetes Father     Stroke Father        Social History  Social History    Tobacco Use      Smoking status: Never      Smokeless tobacco: Never      Tobacco comments: Caffeine use 6 diet cokes daily       Is the Patient a current tobacco user? No    Allergies  Allergies   Allergen Reactions    Penicillins Unknown (See Comments)     childhood  Mild rash as child    Other reaction(s): Unknown (See Comments)  childhood  Mild rash as child    Other reaction(s): Unknown (See Comments)  childhood       Current Medications    Current Outpatient Medications:     ACCU-CHEK GUIDE test strip, , Disp: , Rfl:     B-D ULTRAFINE III SHORT PEN 31G X 8 MM misc, , Disp: , Rfl:     esomeprazole (nexIUM) 20 MG capsule, Take 1 capsule by mouth Every Morning Before Breakfast., Disp: , Rfl:     Glucagon (Baqsimi One Pack) 3 MG/DOSE powder, Use as directed for sever hypoglycemic event., Disp: , Rfl:     HUMALOG 100 UNIT/ML solution cartridge, , Disp: , Rfl:     Insulin Glargine (TOUJEO SOLOSTAR SC), Inject 70 Units under the skin into the appropriate area as directed., Disp: , Rfl:     metFORMIN ER (GLUCOPHAGE-XR) 500 MG 24 hr tablet, Take  by mouth 2 (Two) Times a Day., Disp: , Rfl:     rosuvastatin (CRESTOR) 10 MG tablet, Take 1 tablet by mouth Daily., Disp: 30 tablet, Rfl: 11    venlafaxine XR (EFFEXOR-XR) 75 MG 24 hr capsule, Take 1 capsule by mouth Daily., Disp: 90  capsule, Rfl: 1    Ozempic, 1 MG/DOSE, 2 MG/1.5ML solution pen-injector, Inject 1 mg under the skin into the appropriate area as directed 1 (One) Time Per Week. (Patient not taking: Reported on 6/1/2023), Disp: , Rfl:     triamcinolone (KENALOG) 0.5 % ointment, Apply 1 application topically to the appropriate area as directed 2 (Two) Times a Day. (Patient not taking: Reported on 6/1/2023), Disp: 15 g, Rfl: 1     Review of System  Review of Systems   Constitutional:  Negative for chills and fever.   Respiratory:  Negative for cough and shortness of breath.    Cardiovascular:  Negative for chest pain and palpitations.   Gastrointestinal:  Negative for constipation, diarrhea, nausea and vomiting.   Neurological:  Negative for dizziness and headache.     I have reviewed and confirmed the accuracy of the ROS as documented by the MA/LPN/RN Niko Huggins MD    Vitals:    11/13/23 1140   BP: 118/80     Body mass index is 58.58 kg/m².    Objective     Physical Exam  Physical Exam  Vitals and nursing note reviewed.   Constitutional:       Appearance: He is well-developed.   HENT:      Head: Normocephalic and atraumatic.   Eyes:      Conjunctiva/sclera: Conjunctivae normal.      Pupils: Pupils are equal, round, and reactive to light.   Cardiovascular:      Rate and Rhythm: Normal rate and regular rhythm.      Heart sounds: Normal heart sounds.   Pulmonary:      Effort: Pulmonary effort is normal.      Breath sounds: Normal breath sounds.   Abdominal:      General: Bowel sounds are normal.      Palpations: Abdomen is soft.   Musculoskeletal:         General: Normal range of motion.      Cervical back: Normal range of motion and neck supple.   Skin:     General: Skin is warm.   Neurological:      Mental Status: He is alert.   Psychiatric:         Behavior: Behavior normal.         Thought Content: Thought content normal.         Judgment: Judgment normal.             Niko Huggins MD  11/13/2023

## 2024-05-23 ENCOUNTER — TELEPHONE (OUTPATIENT)
Dept: FAMILY MEDICINE CLINIC | Facility: CLINIC | Age: 44
End: 2024-05-23
Payer: COMMERCIAL

## 2024-05-23 NOTE — TELEPHONE ENCOUNTER
HUB TO RELAY AND SCHEDULE.    The patient was supposed to have an appointment 6/7 however, their provider will be out of office. Let a christy

## 2024-07-07 DIAGNOSIS — F33.40 RECURRENT MAJOR DEPRESSIVE DISORDER, IN REMISSION: ICD-10-CM

## 2024-07-08 RX ORDER — VENLAFAXINE HYDROCHLORIDE 75 MG/1
75 CAPSULE, EXTENDED RELEASE ORAL DAILY
Qty: 90 CAPSULE | Refills: 1 | OUTPATIENT
Start: 2024-07-08

## 2024-07-22 DIAGNOSIS — F33.40 RECURRENT MAJOR DEPRESSIVE DISORDER, IN REMISSION: ICD-10-CM

## 2024-07-22 RX ORDER — VENLAFAXINE HYDROCHLORIDE 75 MG/1
75 CAPSULE, EXTENDED RELEASE ORAL DAILY
Qty: 90 CAPSULE | Refills: 1 | OUTPATIENT
Start: 2024-07-22

## 2024-07-30 DIAGNOSIS — F33.40 RECURRENT MAJOR DEPRESSIVE DISORDER, IN REMISSION: ICD-10-CM

## 2024-07-30 RX ORDER — VENLAFAXINE HYDROCHLORIDE 75 MG/1
75 CAPSULE, EXTENDED RELEASE ORAL DAILY
Qty: 90 CAPSULE | Refills: 1 | OUTPATIENT
Start: 2024-07-30

## 2024-08-09 DIAGNOSIS — F33.40 RECURRENT MAJOR DEPRESSIVE DISORDER, IN REMISSION: ICD-10-CM

## 2024-08-09 NOTE — TELEPHONE ENCOUNTER
Caller: Paul Medina    Relationship: Self    Best call back number: [phone]    Requested Prescriptions:   Requested Prescriptions     Pending Prescriptions Disp Refills    venlafaxine XR (EFFEXOR-XR) 75 MG 24 hr capsule 90 capsule 1     Sig: Take 1 capsule by mouth Daily.        Pharmacy where request should be sent: [facility] [id number] [address] AT [address] [place] [address] [phone] [facility] [phone] FX     Last office visit with prescribing clinician: 11/13/2023   Last telemedicine visit with prescribing clinician: Visit date not found   Next office visit with prescribing clinician: 8/15/2024     Additional details provided by patient:     Does the patient have less than a 3 day supply:  [x] Yes  [] No        Octaviano Cannon   08/09/24 10:38 EDT

## 2024-08-09 NOTE — TELEPHONE ENCOUNTER
Patient has an appointment for 8/15/2024   Patient admitted for psychotic episode secondary to cannabis use   Chart reviewed and discussed with nursing team    pt seen for follow up of psychotic symptoms.   Patient remains with mild thought disorganization but has been in behavioral control and taking medications. Patient states he slept well and has no adverse effects from the Haldol aside from drowsiness. Patient states that he had an injection of the SERRATO today in his arm. Patient states that his parents visited last night, feels that his dad is relatively normal and not a clone. Patient states that mother seems off, and that she "barely talked." Patient states he feels his mother loves him "too much." When asked to elaborate patient states he feels his mom is attracted to him in an inappropriate way. When asked if it is the clone version of his mother or real mother, patient states it is his "real mom" that feels this way. Patient states he is not comfortable discussing further and that it is "personal." Patient denies any hopelessness or SI. Denies AH.

## 2024-08-12 RX ORDER — VENLAFAXINE HYDROCHLORIDE 75 MG/1
75 CAPSULE, EXTENDED RELEASE ORAL DAILY
Qty: 90 CAPSULE | Refills: 1 | Status: SHIPPED | OUTPATIENT
Start: 2024-08-12

## 2024-08-15 ENCOUNTER — OFFICE VISIT (OUTPATIENT)
Dept: FAMILY MEDICINE CLINIC | Facility: CLINIC | Age: 44
End: 2024-08-15
Payer: COMMERCIAL

## 2024-08-15 VITALS
DIASTOLIC BLOOD PRESSURE: 60 MMHG | HEIGHT: 71 IN | SYSTOLIC BLOOD PRESSURE: 120 MMHG | BODY MASS INDEX: 44.1 KG/M2 | WEIGHT: 315 LBS

## 2024-08-15 DIAGNOSIS — F32.A DEPRESSIVE DISORDER: ICD-10-CM

## 2024-08-15 DIAGNOSIS — E11.8 TYPE 2 DIABETES MELLITUS WITH COMPLICATION, WITH LONG-TERM CURRENT USE OF INSULIN: ICD-10-CM

## 2024-08-15 DIAGNOSIS — F33.40 RECURRENT MAJOR DEPRESSIVE DISORDER, IN REMISSION: Primary | ICD-10-CM

## 2024-08-15 DIAGNOSIS — F90.9 ATTENTION DEFICIT HYPERACTIVITY DISORDER (ADHD), UNSPECIFIED ADHD TYPE: ICD-10-CM

## 2024-08-15 DIAGNOSIS — Z79.4 TYPE 2 DIABETES MELLITUS WITH COMPLICATION, WITH LONG-TERM CURRENT USE OF INSULIN: ICD-10-CM

## 2024-08-15 PROCEDURE — 99213 OFFICE O/P EST LOW 20 MIN: CPT | Performed by: INTERNAL MEDICINE

## 2024-08-19 NOTE — PATIENT INSTRUCTIONS
Keep a BP log over the next 4 weeks   Once you run out of 40 mg Lasix switch back to 20 mg a day. If increase in lower extremity edema, call me and we can go back up to 40 mg a day.  
Alert and oriented to person, place, time/situation. normal mood and affect. no apparent risk to self or others.

## 2024-08-29 NOTE — PROGRESS NOTES
08/15/2024    Assessment & Plan       This 43-year-old patient presents today for follow-up of hypertension he thinks that he may be having some side effects from the various medications he is on and he thinks that his blood pressure is elevated.    His blood pressure in the office today was 120/60 in the left arm sitting position large cuff.  His BMI is 59.6.    He has been followed by Dr. Pratt for his depression and is currently being treated with Effexor which she feels is helping greatly with his symptoms.  He is concerned that he may be sweating more than normal and note is made that a T4 and TSH done on 7/24 was normal in both respects.    He wants to be started on Strattera for ADHD.  He relates he was started on this medication years ago he took it for a while and then stopped.  I have indicated that we will make a referral for him to be evaluated and possibly treated with that medication by someone in behavioral medicine as I do not prescribe that medication.    He is currently being followed for his diabetes control with [facility].    Diagnoses and all orders for this visit:    1. Recurrent major depressive disorder, in remission (Primary)    2. Type 2 diabetes mellitus with complication, with long-term current use of insulin    3. Depressive disorder      Class 3 Severe Obesity (BMI >=40). Obesity-related health conditions include the following: hypertension and diabetes mellitus. Obesity is worsening. BMI is is above average; BMI management plan is completed. We discussed portion control and increasing exercise.       Plan #1.) referral to behavioral medicine regarding evaluation and treatment of ADHD  2.)  Referral to psychiatry for treatment of recurrent depression now stable  3.)  Follow-up with [facility] for diabetes control    CC: Hypertension (F/U.  May be having side effects with the antidepressants.)  .        HPI  Hypertension  Pertinent negatives  include no blurred vision.        Subjective   Paul Medina is a 43 y.o. male.      The following portions of the patient's history were reviewed and updated as appropriate: allergies, current medications, past family history, past medical history, past social history, past surgical history, and problem list.    Problem List  Patient Active Problem List   Diagnosis    Class 3 severe obesity in adult    Type 1 diabetes mellitus    Secondary lymphedema    Obstructive sleep apnea, adult    Chronic bilateral low back pain without sciatica    Arthritis    Depressive disorder    Encounter for long-term (current) use of other medications    Folic acid deficiency    Gastroesophageal reflux disease    Sleep apnea    Attention deficit hyperactivity disorder (ADHD)    History of vaccination    Hyperlipidemia       Past Medical History  Past Medical History:   Diagnosis Date    ADHD (attention deficit hyperactivity disorder) 1990    Anxiety     Arthritis     Cellulitis 05/10/2019    Depression     Diabetes mellitus     GERD (gastroesophageal reflux disease)     Obesity        Surgical History  Past Surgical History:   Procedure Laterality Date    CARPAL TUNNEL RELEASE Bilateral     CIRCUMCISION      EYE SURGERY  4x    TRIGGER FINGER RELEASE Left     THUMB       Family History  Family History   Problem Relation Age of Onset    Thyroid disease Mother     Anxiety disorder Mother     Diabetes Father     Stroke Father        Social History  Social History    Tobacco Use      Smoking status: Never      Smokeless tobacco: Never      Tobacco comments: Caffeine use 6 diet cokes daily       Is the Patient a current tobacco user? No    Allergies  Allergies   Allergen Reactions    Penicillins Unknown (See Comments)     childhood  Mild rash as child    Other reaction(s): Unknown (See Comments)  childhood  Mild rash as child    Other reaction(s): Unknown (See Comments)  childhood       Current Medications    Current Outpatient  Medications:     ACCU-CHEK GUIDE test strip, , Disp: , Rfl:     B-D ULTRAFINE III SHORT PEN 31G X 8 MM misc, , Disp: , Rfl:     esomeprazole (nexIUM) 20 MG capsule, Take 1 capsule by mouth Every Morning Before Breakfast., Disp: , Rfl:     Glucagon (Baqsimi One Pack) 3 MG/DOSE powder, Use as directed for sever hypoglycemic event., Disp: , Rfl:     HUMALOG 100 UNIT/ML solution cartridge, , Disp: , Rfl:     Insulin Glargine (LANTUS SOLOSTAR SC), Inject 70 Units under the skin into the appropriate area as directed Daily., Disp: , Rfl:     metFORMIN ER (GLUCOPHAGE-XR) 500 MG 24 hr tablet, Take 1 tablet by mouth Daily With Breakfast., Disp: , Rfl:     venlafaxine XR (EFFEXOR-XR) 75 MG 24 hr capsule, Take 1 capsule by mouth Daily., Disp: 90 capsule, Rfl: 1    Insulin Glargine (TOUJEO SOLOSTAR SC), Inject 70 Units under the skin into the appropriate area as directed. (Patient not taking: Reported on 8/15/2024), Disp: , Rfl:     Ozempic, 1 MG/DOSE, 2 MG/1.5ML solution pen-injector, Inject 1 mg under the skin into the appropriate area as directed 1 (One) Time Per Week., Disp: , Rfl:     rosuvastatin (CRESTOR) 10 MG tablet, Take 1 tablet by mouth Daily., Disp: 30 tablet, Rfl: 11     Review of System  Review of Systems   Constitutional:  Negative for unexpected weight loss.   Eyes:  Negative for blurred vision.   Endocrine: Negative for polydipsia and polyuria.     I have reviewed and confirmed the accuracy of the ROS as documented by the MA/LPN/RN Niko Hugigns MD    Vitals:    08/15/24 1522   BP: 120/60     Body mass index is 59.58 kg/m².    Objective     Physical Exam  Physical Exam  Constitutional:       General: He is not in acute distress.     Appearance: Normal appearance.   HENT:      Head: Normocephalic and atraumatic.   Cardiovascular:      Rate and Rhythm: Normal rate and regular rhythm.   Pulmonary:      Effort: Pulmonary effort is normal. No respiratory distress.      Breath sounds: Normal breath sounds. No  wheezing, rhonchi or rales.   Neurological:      General: No focal deficit present.      Mental Status: He is alert and oriented to person, place, and time.   Psychiatric:         Mood and Affect: Mood normal.         Behavior: Behavior normal.         Thought Content: Thought content normal.         Judgment: Judgment normal.             Niko Huggins MD  08/15/2024  Answers submitted by the patient for this visit:  Primary Reason for Visit (Submitted on 8/15/2024)  What is the primary reason for your visit?: Diabetes

## 2024-09-27 ENCOUNTER — OFFICE VISIT (OUTPATIENT)
Dept: FAMILY MEDICINE CLINIC | Facility: CLINIC | Age: 44
End: 2024-09-27
Payer: COMMERCIAL

## 2024-09-27 VITALS
HEIGHT: 71 IN | BODY MASS INDEX: 44.1 KG/M2 | OXYGEN SATURATION: 96 % | SYSTOLIC BLOOD PRESSURE: 148 MMHG | HEART RATE: 78 BPM | DIASTOLIC BLOOD PRESSURE: 66 MMHG | WEIGHT: 315 LBS | RESPIRATION RATE: 18 BRPM

## 2024-09-27 DIAGNOSIS — Z00.00 ENCOUNTER FOR PHYSICAL EXAMINATION: ICD-10-CM

## 2024-09-27 DIAGNOSIS — E11.8 TYPE 2 DIABETES MELLITUS WITH COMPLICATION, WITH LONG-TERM CURRENT USE OF INSULIN: ICD-10-CM

## 2024-09-27 DIAGNOSIS — E78.5 HYPERLIPIDEMIA, UNSPECIFIED HYPERLIPIDEMIA TYPE: Primary | ICD-10-CM

## 2024-09-27 DIAGNOSIS — Z79.4 TYPE 2 DIABETES MELLITUS WITH COMPLICATION, WITH LONG-TERM CURRENT USE OF INSULIN: ICD-10-CM

## 2024-09-27 PROCEDURE — 99396 PREV VISIT EST AGE 40-64: CPT | Performed by: INTERNAL MEDICINE

## 2025-02-24 DIAGNOSIS — F33.40 RECURRENT MAJOR DEPRESSIVE DISORDER, IN REMISSION: ICD-10-CM

## 2025-02-24 RX ORDER — VENLAFAXINE HYDROCHLORIDE 75 MG/1
75 CAPSULE, EXTENDED RELEASE ORAL DAILY
Qty: 90 CAPSULE | Refills: 1 | Status: SHIPPED | OUTPATIENT
Start: 2025-02-24